# Patient Record
Sex: FEMALE | Race: WHITE | NOT HISPANIC OR LATINO | URBAN - METROPOLITAN AREA
[De-identification: names, ages, dates, MRNs, and addresses within clinical notes are randomized per-mention and may not be internally consistent; named-entity substitution may affect disease eponyms.]

---

## 2017-01-25 ENCOUNTER — EMERGENCY (EMERGENCY)
Facility: HOSPITAL | Age: 82
LOS: 1 days | Discharge: HOME | End: 2017-01-25
Attending: EMERGENCY MEDICINE | Admitting: EMERGENCY MEDICINE
Payer: MEDICARE

## 2017-01-25 VITALS
SYSTOLIC BLOOD PRESSURE: 132 MMHG | DIASTOLIC BLOOD PRESSURE: 77 MMHG | HEART RATE: 66 BPM | RESPIRATION RATE: 18 BRPM | OXYGEN SATURATION: 96 % | TEMPERATURE: 99 F

## 2017-01-25 VITALS
RESPIRATION RATE: 17 BRPM | SYSTOLIC BLOOD PRESSURE: 139 MMHG | HEART RATE: 76 BPM | OXYGEN SATURATION: 95 % | TEMPERATURE: 98 F | DIASTOLIC BLOOD PRESSURE: 76 MMHG

## 2017-01-25 DIAGNOSIS — M25.532 PAIN IN LEFT WRIST: ICD-10-CM

## 2017-01-25 LAB
ANION GAP SERPL CALC-SCNC: 13 MMOL/L — SIGNIFICANT CHANGE UP (ref 5–17)
APTT BLD: 33.5 SEC — SIGNIFICANT CHANGE UP (ref 27.5–37.4)
BASOPHILS # BLD AUTO: 0 K/UL — SIGNIFICANT CHANGE UP (ref 0–0.2)
BASOPHILS NFR BLD AUTO: 0.4 % — SIGNIFICANT CHANGE UP (ref 0–2)
BUN SERPL-MCNC: 16 MG/DL — SIGNIFICANT CHANGE UP (ref 7–23)
CALCIUM SERPL-MCNC: 10.2 MG/DL — SIGNIFICANT CHANGE UP (ref 8.4–10.5)
CHLORIDE SERPL-SCNC: 98 MMOL/L — SIGNIFICANT CHANGE UP (ref 96–108)
CO2 SERPL-SCNC: 29 MMOL/L — SIGNIFICANT CHANGE UP (ref 22–31)
CREAT SERPL-MCNC: 0.83 MG/DL — SIGNIFICANT CHANGE UP (ref 0.5–1.3)
EOSINOPHIL # BLD AUTO: 0.1 K/UL — SIGNIFICANT CHANGE UP (ref 0–0.5)
EOSINOPHIL NFR BLD AUTO: 0.7 % — SIGNIFICANT CHANGE UP (ref 0–6)
GLUCOSE SERPL-MCNC: 120 MG/DL — HIGH (ref 70–99)
HCT VFR BLD CALC: 40.9 % — SIGNIFICANT CHANGE UP (ref 34.5–45)
HGB BLD-MCNC: 13.7 G/DL — SIGNIFICANT CHANGE UP (ref 11.5–15.5)
INR BLD: 2.2 RATIO — HIGH (ref 0.88–1.16)
LYMPHOCYTES # BLD AUTO: 1.5 K/UL — SIGNIFICANT CHANGE UP (ref 1–3.3)
LYMPHOCYTES # BLD AUTO: 13.4 % — SIGNIFICANT CHANGE UP (ref 13–44)
MCHC RBC-ENTMCNC: 31.6 PG — SIGNIFICANT CHANGE UP (ref 27–34)
MCHC RBC-ENTMCNC: 33.5 GM/DL — SIGNIFICANT CHANGE UP (ref 32–36)
MCV RBC AUTO: 94.2 FL — SIGNIFICANT CHANGE UP (ref 80–100)
MONOCYTES # BLD AUTO: 1 K/UL — HIGH (ref 0–0.9)
MONOCYTES NFR BLD AUTO: 8.8 % — SIGNIFICANT CHANGE UP (ref 2–14)
NEUTROPHILS # BLD AUTO: 8.4 K/UL — HIGH (ref 1.8–7.4)
NEUTROPHILS NFR BLD AUTO: 76.6 % — SIGNIFICANT CHANGE UP (ref 43–77)
PLATELET # BLD AUTO: 191 K/UL — SIGNIFICANT CHANGE UP (ref 150–400)
POTASSIUM SERPL-MCNC: 4 MMOL/L — SIGNIFICANT CHANGE UP (ref 3.5–5.3)
POTASSIUM SERPL-SCNC: 4 MMOL/L — SIGNIFICANT CHANGE UP (ref 3.5–5.3)
PROTHROM AB SERPL-ACNC: 23.9 SEC — HIGH (ref 10–13.1)
RBC # BLD: 4.34 M/UL — SIGNIFICANT CHANGE UP (ref 3.8–5.2)
RBC # FLD: 13.7 % — SIGNIFICANT CHANGE UP (ref 10.3–14.5)
SODIUM SERPL-SCNC: 140 MMOL/L — SIGNIFICANT CHANGE UP (ref 135–145)
WBC # BLD: 11 K/UL — HIGH (ref 3.8–10.5)
WBC # FLD AUTO: 11 K/UL — HIGH (ref 3.8–10.5)

## 2017-01-25 PROCEDURE — 80048 BASIC METABOLIC PNL TOTAL CA: CPT

## 2017-01-25 PROCEDURE — 73090 X-RAY EXAM OF FOREARM: CPT | Mod: 26,LT

## 2017-01-25 PROCEDURE — 85610 PROTHROMBIN TIME: CPT

## 2017-01-25 PROCEDURE — 73090 X-RAY EXAM OF FOREARM: CPT

## 2017-01-25 PROCEDURE — 99284 EMERGENCY DEPT VISIT MOD MDM: CPT | Mod: 25

## 2017-01-25 PROCEDURE — 99284 EMERGENCY DEPT VISIT MOD MDM: CPT

## 2017-01-25 PROCEDURE — 73100 X-RAY EXAM OF WRIST: CPT | Mod: 26,59,LT

## 2017-01-25 PROCEDURE — 85730 THROMBOPLASTIN TIME PARTIAL: CPT

## 2017-01-25 PROCEDURE — 73562 X-RAY EXAM OF KNEE 3: CPT

## 2017-01-25 PROCEDURE — 73110 X-RAY EXAM OF WRIST: CPT | Mod: 26,LT

## 2017-01-25 PROCEDURE — 85027 COMPLETE CBC AUTOMATED: CPT

## 2017-01-25 PROCEDURE — 73110 X-RAY EXAM OF WRIST: CPT

## 2017-01-25 PROCEDURE — 73562 X-RAY EXAM OF KNEE 3: CPT | Mod: 26,LT

## 2017-01-25 PROCEDURE — 73100 X-RAY EXAM OF WRIST: CPT

## 2017-01-25 RX ORDER — ACETAMINOPHEN 500 MG
650 TABLET ORAL ONCE
Qty: 0 | Refills: 0 | Status: COMPLETED | OUTPATIENT
Start: 2017-01-25 | End: 2017-01-25

## 2017-01-25 RX ADMIN — Medication 650 MILLIGRAM(S): at 19:28

## 2017-01-25 RX ADMIN — Medication 650 MILLIGRAM(S): at 17:26

## 2017-01-25 NOTE — ED PROVIDER NOTE - CARE PLAN
Principal Discharge DX:	Closed nondisplaced fracture of head of left radius, initial encounter  Instructions for follow-up, activity and diet:	Rest, increase activity as tolerated. Return to the ER for any concerns.  Keep splint in place. Do not get wet or remove.   Follow up with your Fox [phone] or your own orthopedist. Return to the ER for any concerns such as numbness tingling, weakness or coolness ot extremity. Principal Discharge DX:	Closed nondisplaced fracture of head of left radius, initial encounter  Instructions for follow-up, activity and diet:	Rest, increase activity as tolerated. Return to the ER for any concerns.  Keep splint in place. Do not get wet or remove.   Follow up with your Fox [phone] or your own orthopedist. Return to the ER for any concerns such as numbness tingling, weakness or coolness ot extremity. Principal Discharge DX:	Closed nondisplaced fracture of head of left radius, initial encounter  Instructions for follow-up, activity and diet:	Rest, increase activity as tolerated. Return to the ER for any concerns.  Keep splint in place. Do not get wet or remove.   Follow up with your Fox [phone] or your own orthopedist. Return to the ER for any concerns such as numbness tingling, weakness or coolness ot extremity.

## 2017-01-25 NOTE — ED PROVIDER NOTE - DIAGNOSIS COUNSELING, MDM
I had a detailed discussion with the patient and/or guardian regarding the historical points, exam findings, and any diagnostic results supporting the discharge/admit diagnosis. conducted a detailed discussion...

## 2017-01-25 NOTE — ED PROCEDURE NOTE - NS ED PERI NEURO NEG
Pre-application: Motor, sensory, and vascular responses intact in the injured extremity./The patient/caregiver verbalized understanding of how to care for the injured extremity with splint/Post-application: Motor, sensory, and vascular responses intact in the injured extremity.

## 2017-01-25 NOTE — ED ADULT NURSE NOTE - PMH
Aortic stenosis  moderate  Asthma    Atrial fibrillation    HLD (hyperlipidemia)    HTN (hypertension)    Morbid obesity    Spinal stenosis

## 2017-01-25 NOTE — ED PROVIDER NOTE - PLAN OF CARE
Rest, increase activity as tolerated. Return to the ER for any concerns.  Keep splint in place. Do not get wet or remove.   Follow up with your Fox [phone] or your own orthopedist. Return to the ER for any concerns such as numbness tingling, weakness or coolness ot extremity.

## 2017-01-25 NOTE — ED PROVIDER NOTE - OBJECTIVE STATEMENT
85 yo female presents to the ER for evaluation of fracture to left distal radius s/p trip and fall. Pt states "I tripped and fell on uneven ground in my home and first landed on my knee and then my left hand. I went to the orthopedist and they sent me here to the ER. I am  having pain to my knee and did not have xrays done. Denies head injury. denies loc 85 yo female presents to the ER for evaluation of fracture to left distal radius s/p trip and fall. Pt states "I tripped and fell on uneven ground in my home and first landed on my knee and then my left hand. I went to the orthopedist and they sent me here to the ER. I am  having pain to my knee and did not have xrays done. Denies head injury. denies loc  riaz:  pain, moderate, constant, left wrist, + swelling.  left knee pain, mild, getting worse, able to walk.  did not hit head. no ha, neck pain, cp, abd pain.  sent from ortho for reduction.  recent inr "good"

## 2017-01-25 NOTE — ED PROVIDER NOTE - ATTENDING CONTRIBUTION TO CARE
. .mechanical fall - left wrist fx, reduced by ortho.  left knee contusion.  pt on coumadin, no signs of head injury/ich.  labs checked.  stable for d/c.  has help at home. .mechanical fall - left wrist fx, reduced/splinted by ortho.  left knee contusion.  pt on coumadin, no signs of head injury/ich.  labs checked.  stable for d/c.  has help at home.

## 2017-01-27 ENCOUNTER — EMERGENCY (EMERGENCY)
Facility: HOSPITAL | Age: 82
LOS: 1 days | Discharge: HOME | End: 2017-01-27
Attending: EMERGENCY MEDICINE | Admitting: EMERGENCY MEDICINE
Payer: MEDICARE

## 2017-01-27 VITALS
TEMPERATURE: 98 F | RESPIRATION RATE: 20 BRPM | DIASTOLIC BLOOD PRESSURE: 78 MMHG | HEART RATE: 85 BPM | SYSTOLIC BLOOD PRESSURE: 159 MMHG | OXYGEN SATURATION: 96 %

## 2017-01-27 DIAGNOSIS — S60.011A CONTUSION OF RIGHT THUMB WITHOUT DAMAGE TO NAIL, INITIAL ENCOUNTER: ICD-10-CM

## 2017-01-27 PROCEDURE — 99284 EMERGENCY DEPT VISIT MOD MDM: CPT

## 2017-01-27 PROCEDURE — 73130 X-RAY EXAM OF HAND: CPT

## 2017-01-27 PROCEDURE — 73090 X-RAY EXAM OF FOREARM: CPT | Mod: 26,LT

## 2017-01-27 PROCEDURE — 73130 X-RAY EXAM OF HAND: CPT | Mod: 26,LT

## 2017-01-27 PROCEDURE — 73090 X-RAY EXAM OF FOREARM: CPT

## 2017-01-27 PROCEDURE — 99283 EMERGENCY DEPT VISIT LOW MDM: CPT | Mod: GC

## 2017-01-27 NOTE — ED PROVIDER NOTE - MEDICAL DECISION MAKING DETAILS
83 y/o F p/w L. arm pain after colles fx diagnosed 2 days ago. Ace wrap removed and pt w/ good cap refill, full ROM of fingers, RUM nerves intact. Plan: Re-wrap, analgesia, ortho f/u.

## 2017-01-27 NOTE — ED PROVIDER NOTE - OBJECTIVE STATEMENT
85 y/o F presents w/ L arm pain. Fell 2 days ago, diagnosed w/ colles fx at [facility]. Seen by ortho, fx reduced, splinted, pt has f/u w/ ortho next Wed. Here b/c of pain to lateral aspect of forearm and bruising to thumb of left hand. Full ROM of all 5 fingers of L. hand.

## 2017-01-27 NOTE — ED PROVIDER NOTE - PROGRESS NOTE DETAILS
Took off ace wrap of splint. Minimal ecchymosis of L. thumb. Discussed plan w patient and family regarding plan in agreement. RG

## 2017-01-27 NOTE — ED ADULT NURSE NOTE - OBJECTIVE STATEMENT
c/o increasing pain and bruising to LUE. Patient was dx here at [facility] with LUE fx 2 days ago after falling. Able to move all fingers, no numbness or tingling.

## 2017-01-27 NOTE — ED PROVIDER NOTE - ATTENDING CONTRIBUTION TO CARE
****ATTENDING**** 83 yo f s/p L wrist fracture seen at [facility] s/p reduction pw pain at site of cast. Pt also notes that her thumb has also turned "blue". No numbness or tingling. No other acute complaints. On exam, L forearm in cast, cap refill <2secs. L thumb w ecchymosis non tender. nml sensation.  Dw patient that likely related to fracture leading to swelling and now ecchymosis. Patient is on coumadin. No other swelling or ecchymosis noted. Repeat xray, and check hand xray to ro injury. States tylenol is not enough and would like something stronger. States she has taken percocet before. Explained to be careful on medication and drowsiness. Family at bedside.

## 2017-01-27 NOTE — ED PROVIDER NOTE - PHYSICAL EXAMINATION
L. arm: Volar splint in place. Good cap refill of all fingers. Ecchymosis of thumb. Full sensation of all fingers, full ROM. Ulnar/median/radial nn intact. L. arm: sugar splint in place. Good cap refill of all fingers. Ecchymosis of thumb. Full sensation of all fingers, full ROM. Ulnar/median/radial nn intact.

## 2017-12-28 ENCOUNTER — INPATIENT (INPATIENT)
Facility: HOSPITAL | Age: 82
LOS: 13 days | Discharge: NURSING, REHAB OR LONG-TERM CARE | DRG: 949 | End: 2018-01-11
Attending: STUDENT IN AN ORGANIZED HEALTH CARE EDUCATION/TRAINING PROGRAM | Admitting: STUDENT IN AN ORGANIZED HEALTH CARE EDUCATION/TRAINING PROGRAM
Payer: MEDICARE

## 2017-12-28 DIAGNOSIS — I35.0 NONRHEUMATIC AORTIC (VALVE) STENOSIS: ICD-10-CM

## 2017-12-28 PROCEDURE — 99223 1ST HOSP IP/OBS HIGH 75: CPT

## 2017-12-29 PROCEDURE — 93010 ELECTROCARDIOGRAM REPORT: CPT

## 2017-12-29 PROCEDURE — 99232 SBSQ HOSP IP/OBS MODERATE 35: CPT

## 2017-12-29 PROCEDURE — 93970 EXTREMITY STUDY: CPT | Mod: 26

## 2017-12-30 PROCEDURE — 71010: CPT | Mod: 26

## 2017-12-30 PROCEDURE — 99232 SBSQ HOSP IP/OBS MODERATE 35: CPT

## 2017-12-31 PROCEDURE — 99232 SBSQ HOSP IP/OBS MODERATE 35: CPT

## 2018-01-01 PROCEDURE — 99223 1ST HOSP IP/OBS HIGH 75: CPT

## 2018-01-01 PROCEDURE — 99232 SBSQ HOSP IP/OBS MODERATE 35: CPT

## 2018-01-02 PROCEDURE — 93971 EXTREMITY STUDY: CPT | Mod: 26,RT

## 2018-01-02 PROCEDURE — 99222 1ST HOSP IP/OBS MODERATE 55: CPT

## 2018-01-02 PROCEDURE — 99232 SBSQ HOSP IP/OBS MODERATE 35: CPT

## 2018-01-03 PROCEDURE — 73080 X-RAY EXAM OF ELBOW: CPT | Mod: 26,RT

## 2018-01-03 PROCEDURE — 99233 SBSQ HOSP IP/OBS HIGH 50: CPT

## 2018-01-03 PROCEDURE — 73090 X-RAY EXAM OF FOREARM: CPT | Mod: 26,LT

## 2018-01-03 PROCEDURE — 99232 SBSQ HOSP IP/OBS MODERATE 35: CPT

## 2018-01-04 PROCEDURE — 99232 SBSQ HOSP IP/OBS MODERATE 35: CPT

## 2018-01-05 PROCEDURE — 99232 SBSQ HOSP IP/OBS MODERATE 35: CPT

## 2018-01-06 PROCEDURE — 99232 SBSQ HOSP IP/OBS MODERATE 35: CPT | Mod: GC

## 2018-01-07 PROCEDURE — 99232 SBSQ HOSP IP/OBS MODERATE 35: CPT

## 2018-01-08 PROCEDURE — 99232 SBSQ HOSP IP/OBS MODERATE 35: CPT

## 2018-01-09 PROCEDURE — 93306 TTE W/DOPPLER COMPLETE: CPT | Mod: 26

## 2018-01-09 PROCEDURE — 93010 ELECTROCARDIOGRAM REPORT: CPT

## 2018-01-09 PROCEDURE — 99223 1ST HOSP IP/OBS HIGH 75: CPT

## 2018-01-09 PROCEDURE — 99233 SBSQ HOSP IP/OBS HIGH 50: CPT

## 2018-01-10 PROCEDURE — 99233 SBSQ HOSP IP/OBS HIGH 50: CPT

## 2018-01-10 PROCEDURE — 99232 SBSQ HOSP IP/OBS MODERATE 35: CPT

## 2018-01-10 PROCEDURE — 71046 X-RAY EXAM CHEST 2 VIEWS: CPT | Mod: 26

## 2018-01-11 PROCEDURE — 99238 HOSP IP/OBS DSCHRG MGMT 30/<: CPT

## 2018-01-11 PROCEDURE — 99232 SBSQ HOSP IP/OBS MODERATE 35: CPT

## 2018-01-13 ENCOUNTER — OUTPATIENT (OUTPATIENT)
Dept: OUTPATIENT SERVICES | Facility: HOSPITAL | Age: 83
LOS: 1 days | Discharge: HOME | End: 2018-01-13
Payer: MEDICARE

## 2018-01-13 DIAGNOSIS — G89.29 OTHER CHRONIC PAIN: ICD-10-CM

## 2018-01-13 PROCEDURE — 73090 X-RAY EXAM OF FOREARM: CPT | Mod: 26,RT

## 2018-01-15 ENCOUNTER — OUTPATIENT (OUTPATIENT)
Dept: OUTPATIENT SERVICES | Facility: HOSPITAL | Age: 83
LOS: 1 days | Discharge: HOME | End: 2018-01-15
Payer: MEDICARE

## 2018-01-15 DIAGNOSIS — S63.501A UNSPECIFIED SPRAIN OF RIGHT WRIST, INITIAL ENCOUNTER: ICD-10-CM

## 2018-01-15 PROCEDURE — 73200 CT UPPER EXTREMITY W/O DYE: CPT | Mod: 26,RT

## 2018-01-18 ENCOUNTER — OUTPATIENT (OUTPATIENT)
Dept: OUTPATIENT SERVICES | Facility: HOSPITAL | Age: 83
LOS: 1 days | Discharge: ANOTHER HOSPITAL | End: 2018-01-18
Payer: MEDICARE

## 2018-01-18 ENCOUNTER — APPOINTMENT (OUTPATIENT)
Dept: CT IMAGING | Facility: HOSPITAL | Age: 83
End: 2018-01-18

## 2018-01-18 DIAGNOSIS — C41.9 MALIGNANT NEOPLASM OF BONE AND ARTICULAR CARTILAGE, UNSPECIFIED: ICD-10-CM

## 2018-01-18 PROCEDURE — 97163 PT EVAL HIGH COMPLEX 45 MIN: CPT

## 2018-01-18 PROCEDURE — 94640 AIRWAY INHALATION TREATMENT: CPT

## 2018-01-18 PROCEDURE — 73080 X-RAY EXAM OF ELBOW: CPT

## 2018-01-18 PROCEDURE — 74177 CT ABD & PELVIS W/CONTRAST: CPT | Mod: 26

## 2018-01-18 PROCEDURE — 82728 ASSAY OF FERRITIN: CPT

## 2018-01-18 PROCEDURE — 97116 GAIT TRAINING THERAPY: CPT

## 2018-01-18 PROCEDURE — 82746 ASSAY OF FOLIC ACID SERUM: CPT

## 2018-01-18 PROCEDURE — 71045 X-RAY EXAM CHEST 1 VIEW: CPT

## 2018-01-18 PROCEDURE — 85025 COMPLETE CBC W/AUTO DIFF WBC: CPT

## 2018-01-18 PROCEDURE — 93005 ELECTROCARDIOGRAM TRACING: CPT

## 2018-01-18 PROCEDURE — 85027 COMPLETE CBC AUTOMATED: CPT

## 2018-01-18 PROCEDURE — 93306 TTE W/DOPPLER COMPLETE: CPT

## 2018-01-18 PROCEDURE — 84165 PROTEIN E-PHORESIS SERUM: CPT

## 2018-01-18 PROCEDURE — 84156 ASSAY OF PROTEIN URINE: CPT

## 2018-01-18 PROCEDURE — 97110 THERAPEUTIC EXERCISES: CPT

## 2018-01-18 PROCEDURE — 84443 ASSAY THYROID STIM HORMONE: CPT

## 2018-01-18 PROCEDURE — 86140 C-REACTIVE PROTEIN: CPT

## 2018-01-18 PROCEDURE — 83735 ASSAY OF MAGNESIUM: CPT

## 2018-01-18 PROCEDURE — 84439 ASSAY OF FREE THYROXINE: CPT

## 2018-01-18 PROCEDURE — 71046 X-RAY EXAM CHEST 2 VIEWS: CPT

## 2018-01-18 PROCEDURE — 80048 BASIC METABOLIC PNL TOTAL CA: CPT

## 2018-01-18 PROCEDURE — 93971 EXTREMITY STUDY: CPT

## 2018-01-18 PROCEDURE — 83521 IG LIGHT CHAINS FREE EACH: CPT

## 2018-01-18 PROCEDURE — 86334 IMMUNOFIX E-PHORESIS SERUM: CPT

## 2018-01-18 PROCEDURE — 84155 ASSAY OF PROTEIN SERUM: CPT

## 2018-01-18 PROCEDURE — 83550 IRON BINDING TEST: CPT

## 2018-01-18 PROCEDURE — 84100 ASSAY OF PHOSPHORUS: CPT

## 2018-01-18 PROCEDURE — 93970 EXTREMITY STUDY: CPT

## 2018-01-18 PROCEDURE — 97167 OT EVAL HIGH COMPLEX 60 MIN: CPT

## 2018-01-18 PROCEDURE — 97535 SELF CARE MNGMENT TRAINING: CPT

## 2018-01-18 PROCEDURE — 82607 VITAMIN B-12: CPT

## 2018-01-18 PROCEDURE — 73090 X-RAY EXAM OF FOREARM: CPT

## 2018-01-18 PROCEDURE — 97530 THERAPEUTIC ACTIVITIES: CPT

## 2018-01-18 PROCEDURE — 97112 NEUROMUSCULAR REEDUCATION: CPT

## 2018-01-18 PROCEDURE — 85045 AUTOMATED RETICULOCYTE COUNT: CPT

## 2018-01-18 PROCEDURE — 85610 PROTHROMBIN TIME: CPT

## 2018-01-18 PROCEDURE — 71260 CT THORAX DX C+: CPT | Mod: 26

## 2018-01-18 PROCEDURE — 82652 VIT D 1 25-DIHYDROXY: CPT

## 2018-01-18 PROCEDURE — 86325 OTHER IMMUNOELECTROPHORESIS: CPT

## 2018-01-18 PROCEDURE — 85652 RBC SED RATE AUTOMATED: CPT

## 2018-01-18 PROCEDURE — 80053 COMPREHEN METABOLIC PANEL: CPT

## 2018-01-24 ENCOUNTER — OUTPATIENT (OUTPATIENT)
Dept: OUTPATIENT SERVICES | Facility: HOSPITAL | Age: 83
LOS: 1 days | Discharge: HOME | End: 2018-01-24
Payer: MEDICARE

## 2018-01-24 DIAGNOSIS — C79.9 SECONDARY MALIGNANT NEOPLASM OF UNSPECIFIED SITE: ICD-10-CM

## 2018-01-24 PROCEDURE — 78306 BONE IMAGING WHOLE BODY: CPT | Mod: 26

## 2018-01-27 ENCOUNTER — OUTPATIENT (OUTPATIENT)
Dept: OUTPATIENT SERVICES | Facility: HOSPITAL | Age: 83
LOS: 1 days | Discharge: HOME | End: 2018-01-27

## 2018-01-27 DIAGNOSIS — I50.9 HEART FAILURE, UNSPECIFIED: ICD-10-CM

## 2018-01-30 ENCOUNTER — OUTPATIENT (OUTPATIENT)
Dept: OUTPATIENT SERVICES | Facility: HOSPITAL | Age: 83
LOS: 1 days | Discharge: HOME | End: 2018-01-30
Payer: MEDICARE

## 2018-01-30 DIAGNOSIS — M25.562 PAIN IN LEFT KNEE: ICD-10-CM

## 2018-01-30 PROCEDURE — 73700 CT LOWER EXTREMITY W/O DYE: CPT | Mod: 26,LT

## 2018-02-03 ENCOUNTER — OUTPATIENT (OUTPATIENT)
Dept: OUTPATIENT SERVICES | Facility: HOSPITAL | Age: 83
LOS: 1 days | Discharge: HOME | End: 2018-02-03
Payer: MEDICARE

## 2018-02-03 DIAGNOSIS — S82.002C: ICD-10-CM

## 2018-02-03 PROCEDURE — 73562 X-RAY EXAM OF KNEE 3: CPT | Mod: 26,LT

## 2018-02-05 ENCOUNTER — RESULT REVIEW (OUTPATIENT)
Age: 83
End: 2018-02-05

## 2018-02-05 PROCEDURE — 88305 TISSUE EXAM BY PATHOLOGIST: CPT | Mod: 26

## 2018-02-06 ENCOUNTER — INPATIENT (INPATIENT)
Facility: HOSPITAL | Age: 83
LOS: 3 days | Discharge: HOME | End: 2018-02-10
Attending: FAMILY MEDICINE | Admitting: FAMILY MEDICINE
Payer: MEDICARE

## 2018-02-06 VITALS
HEART RATE: 68 BPM | RESPIRATION RATE: 20 BRPM | DIASTOLIC BLOOD PRESSURE: 85 MMHG | SYSTOLIC BLOOD PRESSURE: 158 MMHG | TEMPERATURE: 98 F | OXYGEN SATURATION: 98 % | HEIGHT: 63 IN | WEIGHT: 240.08 LBS

## 2018-02-06 DIAGNOSIS — Z88.2 ALLERGY STATUS TO SULFONAMIDES: ICD-10-CM

## 2018-02-06 DIAGNOSIS — C50.919 MALIGNANT NEOPLASM OF UNSPECIFIED SITE OF UNSPECIFIED FEMALE BREAST: ICD-10-CM

## 2018-02-06 DIAGNOSIS — C25.9 MALIGNANT NEOPLASM OF PANCREAS, UNSPECIFIED: ICD-10-CM

## 2018-02-06 DIAGNOSIS — J45.20 MILD INTERMITTENT ASTHMA, UNCOMPLICATED: ICD-10-CM

## 2018-02-06 DIAGNOSIS — C64.9 MALIGNANT NEOPLASM OF UNSPECIFIED KIDNEY, EXCEPT RENAL PELVIS: ICD-10-CM

## 2018-02-06 DIAGNOSIS — C79.51 SECONDARY MALIGNANT NEOPLASM OF BONE: ICD-10-CM

## 2018-02-06 DIAGNOSIS — I35.0 NONRHEUMATIC AORTIC (VALVE) STENOSIS: ICD-10-CM

## 2018-02-06 DIAGNOSIS — E83.52 HYPERCALCEMIA: ICD-10-CM

## 2018-02-06 DIAGNOSIS — E83.39 OTHER DISORDERS OF PHOSPHORUS METABOLISM: ICD-10-CM

## 2018-02-06 DIAGNOSIS — E86.0 DEHYDRATION: ICD-10-CM

## 2018-02-06 DIAGNOSIS — N28.89 OTHER SPECIFIED DISORDERS OF KIDNEY AND URETER: ICD-10-CM

## 2018-02-06 DIAGNOSIS — Z79.01 LONG TERM (CURRENT) USE OF ANTICOAGULANTS: ICD-10-CM

## 2018-02-06 DIAGNOSIS — E66.9 OBESITY, UNSPECIFIED: ICD-10-CM

## 2018-02-06 DIAGNOSIS — Z79.82 LONG TERM (CURRENT) USE OF ASPIRIN: ICD-10-CM

## 2018-02-06 DIAGNOSIS — E21.3 HYPERPARATHYROIDISM, UNSPECIFIED: ICD-10-CM

## 2018-02-06 DIAGNOSIS — R09.02 HYPOXEMIA: ICD-10-CM

## 2018-02-06 DIAGNOSIS — E78.5 HYPERLIPIDEMIA, UNSPECIFIED: ICD-10-CM

## 2018-02-06 DIAGNOSIS — I48.2 CHRONIC ATRIAL FIBRILLATION: ICD-10-CM

## 2018-02-06 DIAGNOSIS — I10 ESSENTIAL (PRIMARY) HYPERTENSION: ICD-10-CM

## 2018-02-06 LAB
ALBUMIN SERPL ELPH-MCNC: 2.8 G/DL — LOW (ref 3.3–5)
ALP SERPL-CCNC: 80 U/L — SIGNIFICANT CHANGE UP (ref 40–120)
ALT FLD-CCNC: 15 U/L — SIGNIFICANT CHANGE UP (ref 12–78)
ANION GAP SERPL CALC-SCNC: 1 MMOL/L — LOW (ref 5–17)
AST SERPL-CCNC: 19 U/L — SIGNIFICANT CHANGE UP (ref 15–37)
BASOPHILS # BLD AUTO: 0.04 K/UL — SIGNIFICANT CHANGE UP (ref 0–0.2)
BASOPHILS NFR BLD AUTO: 0.4 % — SIGNIFICANT CHANGE UP (ref 0–2)
BILIRUB SERPL-MCNC: 0.3 MG/DL — SIGNIFICANT CHANGE UP (ref 0.2–1.2)
BUN SERPL-MCNC: 13 MG/DL — SIGNIFICANT CHANGE UP (ref 7–23)
CALCIUM SERPL-MCNC: 12.3 MG/DL — HIGH (ref 8.5–10.1)
CHLORIDE SERPL-SCNC: 94 MMOL/L — LOW (ref 96–108)
CO2 SERPL-SCNC: 45 MMOL/L — CRITICAL HIGH (ref 22–31)
CREAT SERPL-MCNC: 0.6 MG/DL — SIGNIFICANT CHANGE UP (ref 0.5–1.3)
EOSINOPHIL # BLD AUTO: 0.15 K/UL — SIGNIFICANT CHANGE UP (ref 0–0.5)
EOSINOPHIL NFR BLD AUTO: 1.5 % — SIGNIFICANT CHANGE UP (ref 0–6)
GLUCOSE SERPL-MCNC: 127 MG/DL — HIGH (ref 70–99)
HCT VFR BLD CALC: 40.4 % — SIGNIFICANT CHANGE UP (ref 34.5–45)
HGB BLD-MCNC: 13 G/DL — SIGNIFICANT CHANGE UP (ref 11.5–15.5)
IMM GRANULOCYTES NFR BLD AUTO: 0.6 % — SIGNIFICANT CHANGE UP (ref 0–1.5)
LYMPHOCYTES # BLD AUTO: 1.21 K/UL — SIGNIFICANT CHANGE UP (ref 1–3.3)
LYMPHOCYTES # BLD AUTO: 12.1 % — LOW (ref 13–44)
MCHC RBC-ENTMCNC: 30 PG — SIGNIFICANT CHANGE UP (ref 27–34)
MCHC RBC-ENTMCNC: 32.2 GM/DL — SIGNIFICANT CHANGE UP (ref 32–36)
MCV RBC AUTO: 93.1 FL — SIGNIFICANT CHANGE UP (ref 80–100)
MONOCYTES # BLD AUTO: 0.99 K/UL — HIGH (ref 0–0.9)
MONOCYTES NFR BLD AUTO: 9.9 % — SIGNIFICANT CHANGE UP (ref 2–14)
NEUTROPHILS # BLD AUTO: 7.58 K/UL — HIGH (ref 1.8–7.4)
NEUTROPHILS NFR BLD AUTO: 75.5 % — SIGNIFICANT CHANGE UP (ref 43–77)
NRBC # BLD: 0 /100 WBCS — SIGNIFICANT CHANGE UP (ref 0–0)
PLATELET # BLD AUTO: 240 K/UL — SIGNIFICANT CHANGE UP (ref 150–400)
POTASSIUM SERPL-MCNC: 3.8 MMOL/L — SIGNIFICANT CHANGE UP (ref 3.5–5.3)
POTASSIUM SERPL-SCNC: 3.8 MMOL/L — SIGNIFICANT CHANGE UP (ref 3.5–5.3)
PROT SERPL-MCNC: 7.6 GM/DL — SIGNIFICANT CHANGE UP (ref 6–8.3)
RBC # BLD: 4.34 M/UL — SIGNIFICANT CHANGE UP (ref 3.8–5.2)
RBC # FLD: 13.4 % — SIGNIFICANT CHANGE UP (ref 10.3–14.5)
SODIUM SERPL-SCNC: 140 MMOL/L — SIGNIFICANT CHANGE UP (ref 135–145)
WBC # BLD: 10.03 K/UL — SIGNIFICANT CHANGE UP (ref 3.8–10.5)
WBC # FLD AUTO: 10.03 K/UL — SIGNIFICANT CHANGE UP (ref 3.8–10.5)

## 2018-02-06 PROCEDURE — 93010 ELECTROCARDIOGRAM REPORT: CPT

## 2018-02-06 PROCEDURE — 99223 1ST HOSP IP/OBS HIGH 75: CPT

## 2018-02-06 PROCEDURE — 99285 EMERGENCY DEPT VISIT HI MDM: CPT

## 2018-02-06 RX ORDER — SODIUM CHLORIDE 9 MG/ML
500 INJECTION INTRAMUSCULAR; INTRAVENOUS; SUBCUTANEOUS ONCE
Qty: 0 | Refills: 0 | Status: COMPLETED | OUTPATIENT
Start: 2018-02-06 | End: 2018-02-06

## 2018-02-06 RX ORDER — PAMIDRONATE DISODIUM 9 MG/ML
90 INJECTION, SOLUTION INTRAVENOUS ONCE
Qty: 0 | Refills: 0 | Status: COMPLETED | OUTPATIENT
Start: 2018-02-06 | End: 2018-02-06

## 2018-02-06 RX ORDER — SODIUM CHLORIDE 9 MG/ML
1000 INJECTION INTRAMUSCULAR; INTRAVENOUS; SUBCUTANEOUS ONCE
Qty: 0 | Refills: 0 | Status: COMPLETED | OUTPATIENT
Start: 2018-02-06 | End: 2018-02-06

## 2018-02-06 RX ADMIN — SODIUM CHLORIDE 500 MILLILITER(S): 9 INJECTION INTRAMUSCULAR; INTRAVENOUS; SUBCUTANEOUS at 22:12

## 2018-02-06 RX ADMIN — SODIUM CHLORIDE 1000 MILLILITER(S): 9 INJECTION INTRAMUSCULAR; INTRAVENOUS; SUBCUTANEOUS at 22:50

## 2018-02-06 RX ADMIN — PAMIDRONATE DISODIUM 70 MILLIGRAM(S): 9 INJECTION, SOLUTION INTRAVENOUS at 23:38

## 2018-02-06 NOTE — ED PROVIDER NOTE - OBJECTIVE STATEMENT
Pertinent PMH/PSH/FHx/SHx and Review of Systems contained within:  86 yo f with PMH of Breast Ca with osteolytic bone mets, HTN, HLD and Afib BIB RN from [facility] for elevated Ca levels. Patient reports feeling for lethargic and having bodyaches. Patient also states she feels she's more forgetful than usual. No aggravating or relieving factors, No fever/chills, No photophobia/eye pain/changes in vision, No ear pain/sore throat/dysphagia, No chest pain/palpitations, no SOB/cough/wheeze/stridor, No abdominal pain, No N/V/D, no dysuria/frequency/discharge, No neck/back pain, no rash, no changes in neurological status/function.

## 2018-02-06 NOTE — H&P ADULT - HISTORY OF PRESENT ILLNESS
Admission for hypercalcemia in progress. 85 year old woman with PMH of Breast Ca with osteolytic bone mets, HTN, HLD and Afib on coumadin sent from [facility] for hypercalcemia.  Pt offers no complaints and was sent over by PMD.  Recently fractured right arm.  Admission done on behalf of PMD.      In the ED, Ca found to be 12.3, rest of BMP consistent with dehydration.  Ionized Ca pending.  Started on IVF and given 1 dose IV pamidronate in ED.

## 2018-02-06 NOTE — H&P ADULT - PROBLEM SELECTOR PLAN 1
IVF hydration-monitor ins and outs   Follow ionized Ca  Serial Ca measurements  Consider endocrine consult  Will send PTH

## 2018-02-06 NOTE — H&P ADULT - NSHPPHYSICALEXAM_GEN_ALL_CORE
GENERAL: NAD, obese elderly woman  HEAD:  Atraumatic, Normocephalic  EYES: EOMI, PERRLA, conjunctiva and sclera clear  ENMT: No tonsillar erythema, exudates, or enlargement; Moist mucous membranes, No lesions  NECK: Supple, No JVD, Normal thyroid  NERVOUS SYSTEM:  Alert & Oriented X3, Good concentration  CHEST/LUNG: Clear to ascultation bilaterally; No rales, rhonchi, wheezing, or rubs  HEART: Regular rate and rhythm; No murmurs, rubs, or gallops  ABDOMEN: Soft, Nontender, Nondistended; Bowel sounds present  EXTREMITIES: Right arm in cast  SKIN: no rashes or lesions  PSYCH: normal affect and behavior

## 2018-02-06 NOTE — H&P ADULT - NSHPLABSRESULTS_GEN_ALL_CORE
Vital Signs Last 24 Hrs  T(C): 37.1 (07 Feb 2018 03:40), Max: 37.1 (07 Feb 2018 01:13)  T(F): 98.7 (07 Feb 2018 03:40), Max: 98.7 (07 Feb 2018 01:13)  HR: 74 (07 Feb 2018 03:40) (64 - 74)  BP: 138/65 (07 Feb 2018 03:40) (138/65 - 179/65)  BP(mean): --  RR: 18 (07 Feb 2018 01:13) (18 - 20)  SpO2: 99% (07 Feb 2018 03:40) (98% - 99%)        LABS:                        13.0   10.03 )-----------( 240      ( 06 Feb 2018 22:03 )             40.4     02-06    140  |  94<L>  |  13  ----------------------------<  127<H>  3.8   |  45<HH>  |  0.60    Ca    12.3<H>      06 Feb 2018 22:03    TPro  7.6  /  Alb  2.8<L>  /  TBili  0.3  /  DBili  x   /  AST  19  /  ALT  15  /  AlkPhos  80  02-06

## 2018-02-06 NOTE — H&P ADULT - ASSESSMENT
85 year old woman with PMH of Breast Ca with osteolytic bone mets, HTN, HLD and Afib on coumadin sent from [facility] for hypercalcemia.  Patient will require treatment for hypercalcemia likely from bone mets as detailed below:    IMPROVE VTE Individual Risk Assessment          RISK                                                          Points    [  ] Previous VTE                                                3    [  ] Thrombophilia                                             2    [  ] Lower limb paralysis                                    2        (unable to hold up >15 seconds)      [  ] Current Cancer                                             2         (within 6 months)    [ x ] Immobilization > 24 hrs                              1    [  ] ICU/CCU stay > 24 hours                            1    [ x ] Age > 60                                                    1    IMPROVE VTE Score ____2_____

## 2018-02-06 NOTE — ED PROVIDER NOTE - MEDICAL DECISION MAKING DETAILS
Labs reviewed. Patient given ivfs. Bisphosphonate ordered. Patient admitted to medicine for further management.

## 2018-02-06 NOTE — ED ADULT NURSE NOTE - OBJECTIVE STATEMENT
85yr old femlale sent by PMD  hypercalcemia. patient denies any associated symptoms. patient with right arm in an ace bandage 85yr old femlale sent by PMD  hypercalcemia. patient denies any associated symptoms. patient with right arm in an ace bandage. patient alert on name, noted with confusion

## 2018-02-07 DIAGNOSIS — E83.52 HYPERCALCEMIA: ICD-10-CM

## 2018-02-07 DIAGNOSIS — C79.9 SECONDARY MALIGNANT NEOPLASM OF UNSPECIFIED SITE: ICD-10-CM

## 2018-02-07 DIAGNOSIS — Z29.9 ENCOUNTER FOR PROPHYLACTIC MEASURES, UNSPECIFIED: ICD-10-CM

## 2018-02-07 DIAGNOSIS — I48.2 CHRONIC ATRIAL FIBRILLATION: ICD-10-CM

## 2018-02-07 DIAGNOSIS — E78.2 MIXED HYPERLIPIDEMIA: ICD-10-CM

## 2018-02-07 DIAGNOSIS — J45.20 MILD INTERMITTENT ASTHMA, UNCOMPLICATED: ICD-10-CM

## 2018-02-07 DIAGNOSIS — I10 ESSENTIAL (PRIMARY) HYPERTENSION: ICD-10-CM

## 2018-02-07 DIAGNOSIS — E66.01 MORBID (SEVERE) OBESITY DUE TO EXCESS CALORIES: ICD-10-CM

## 2018-02-07 LAB
ALBUMIN SERPL ELPH-MCNC: 2.5 G/DL — LOW (ref 3.3–5)
ALP SERPL-CCNC: 73 U/L — SIGNIFICANT CHANGE UP (ref 40–120)
ALT FLD-CCNC: 15 U/L — SIGNIFICANT CHANGE UP (ref 12–78)
ANION GAP SERPL CALC-SCNC: 4 MMOL/L — LOW (ref 5–17)
AST SERPL-CCNC: 11 U/L — LOW (ref 15–37)
BASE EXCESS BLDV CALC-SCNC: 13.8 MMOL/L — HIGH (ref -2–2)
BILIRUB SERPL-MCNC: 0.4 MG/DL — SIGNIFICANT CHANGE UP (ref 0.2–1.2)
BLOOD GAS COMMENTS, VENOUS: SIGNIFICANT CHANGE UP
BUN SERPL-MCNC: 13 MG/DL — SIGNIFICANT CHANGE UP (ref 7–23)
CA-I BLD-SCNC: 1.6 MMOL/L — CRITICAL HIGH (ref 1.12–1.3)
CALCIUM SERPL-MCNC: 11.1 MG/DL — HIGH (ref 8.5–10.1)
CALCIUM SERPL-MCNC: 11.6 MG/DL — HIGH (ref 8.5–10.1)
CALCIUM SERPL-MCNC: 12.4 MG/DL — HIGH (ref 8.4–10.5)
CHLORIDE SERPL-SCNC: 95 MMOL/L — LOW (ref 96–108)
CO2 SERPL-SCNC: 40 MMOL/L — HIGH (ref 22–31)
CREAT SERPL-MCNC: 0.54 MG/DL — SIGNIFICANT CHANGE UP (ref 0.5–1.3)
GAS PNL BLDV: SIGNIFICANT CHANGE UP
GLUCOSE SERPL-MCNC: 128 MG/DL — HIGH (ref 70–99)
HCO3 BLDV-SCNC: 40 MMOL/L — HIGH (ref 21–29)
HCT VFR BLD CALC: 36.3 % — SIGNIFICANT CHANGE UP (ref 34.5–45)
HGB BLD-MCNC: 11.5 G/DL — SIGNIFICANT CHANGE UP (ref 11.5–15.5)
HOROWITZ INDEX BLDV+IHG-RTO: 28 — SIGNIFICANT CHANGE UP
INR BLD: 2 RATIO — HIGH (ref 0.88–1.16)
MCHC RBC-ENTMCNC: 29.5 PG — SIGNIFICANT CHANGE UP (ref 27–34)
MCHC RBC-ENTMCNC: 31.7 GM/DL — LOW (ref 32–36)
MCV RBC AUTO: 93.1 FL — SIGNIFICANT CHANGE UP (ref 80–100)
NRBC # BLD: 0 /100 WBCS — SIGNIFICANT CHANGE UP (ref 0–0)
PCO2 BLDV: 56 MMHG — HIGH (ref 35–50)
PH BLDV: 7.46 — HIGH (ref 7.35–7.45)
PLATELET # BLD AUTO: 216 K/UL — SIGNIFICANT CHANGE UP (ref 150–400)
PO2 BLDV: 202 MMHG — HIGH (ref 25–45)
POTASSIUM SERPL-MCNC: 3.6 MMOL/L — SIGNIFICANT CHANGE UP (ref 3.5–5.3)
POTASSIUM SERPL-SCNC: 3.6 MMOL/L — SIGNIFICANT CHANGE UP (ref 3.5–5.3)
PROT SERPL-MCNC: 6.8 GM/DL — SIGNIFICANT CHANGE UP (ref 6–8.3)
PROTHROM AB SERPL-ACNC: 22.1 SEC — HIGH (ref 9.8–12.7)
PTH-INTACT FLD-MCNC: 93 PG/ML — HIGH (ref 15–65)
RBC # BLD: 3.9 M/UL — SIGNIFICANT CHANGE UP (ref 3.8–5.2)
RBC # FLD: 13.5 % — SIGNIFICANT CHANGE UP (ref 10.3–14.5)
SAO2 % BLDV: 100 % — HIGH (ref 67–88)
SODIUM SERPL-SCNC: 139 MMOL/L — SIGNIFICANT CHANGE UP (ref 135–145)
WBC # BLD: 8.55 K/UL — SIGNIFICANT CHANGE UP (ref 3.8–10.5)
WBC # FLD AUTO: 8.55 K/UL — SIGNIFICANT CHANGE UP (ref 3.8–10.5)

## 2018-02-07 RX ORDER — SERTRALINE 25 MG/1
75 TABLET, FILM COATED ORAL DAILY
Qty: 0 | Refills: 0 | Status: DISCONTINUED | OUTPATIENT
Start: 2018-02-07 | End: 2018-02-10

## 2018-02-07 RX ORDER — ATORVASTATIN CALCIUM 80 MG/1
10 TABLET, FILM COATED ORAL AT BEDTIME
Qty: 0 | Refills: 0 | Status: DISCONTINUED | OUTPATIENT
Start: 2018-02-07 | End: 2018-02-10

## 2018-02-07 RX ORDER — PETROLATUM,WHITE
1 JELLY (GRAM) TOPICAL
Qty: 0 | Refills: 0 | Status: DISCONTINUED | OUTPATIENT
Start: 2018-02-07 | End: 2018-02-10

## 2018-02-07 RX ORDER — ALBUTEROL 90 UG/1
2 AEROSOL, METERED ORAL EVERY 6 HOURS
Qty: 0 | Refills: 0 | Status: DISCONTINUED | OUTPATIENT
Start: 2018-02-07 | End: 2018-02-09

## 2018-02-07 RX ORDER — METOPROLOL TARTRATE 50 MG
100 TABLET ORAL DAILY
Qty: 0 | Refills: 0 | Status: DISCONTINUED | OUTPATIENT
Start: 2018-02-07 | End: 2018-02-10

## 2018-02-07 RX ORDER — DILTIAZEM HCL 120 MG
120 CAPSULE, EXT RELEASE 24 HR ORAL DAILY
Qty: 0 | Refills: 0 | Status: DISCONTINUED | OUTPATIENT
Start: 2018-02-07 | End: 2018-02-10

## 2018-02-07 RX ORDER — ASPIRIN/CALCIUM CARB/MAGNESIUM 324 MG
81 TABLET ORAL DAILY
Qty: 0 | Refills: 0 | Status: DISCONTINUED | OUTPATIENT
Start: 2018-02-07 | End: 2018-02-10

## 2018-02-07 RX ORDER — LOSARTAN POTASSIUM 100 MG/1
100 TABLET, FILM COATED ORAL DAILY
Qty: 0 | Refills: 0 | Status: DISCONTINUED | OUTPATIENT
Start: 2018-02-07 | End: 2018-02-10

## 2018-02-07 RX ORDER — WARFARIN SODIUM 2.5 MG/1
7.5 TABLET ORAL ONCE
Qty: 0 | Refills: 0 | Status: COMPLETED | OUTPATIENT
Start: 2018-02-07 | End: 2018-02-07

## 2018-02-07 RX ORDER — SODIUM CHLORIDE 9 MG/ML
1000 INJECTION INTRAMUSCULAR; INTRAVENOUS; SUBCUTANEOUS
Qty: 0 | Refills: 0 | Status: DISCONTINUED | OUTPATIENT
Start: 2018-02-07 | End: 2018-02-08

## 2018-02-07 RX ORDER — OXYCODONE AND ACETAMINOPHEN 5; 325 MG/1; MG/1
1 TABLET ORAL EVERY 6 HOURS
Qty: 0 | Refills: 0 | Status: DISCONTINUED | OUTPATIENT
Start: 2018-02-07 | End: 2018-02-10

## 2018-02-07 RX ORDER — PAMIDRONATE DISODIUM 9 MG/ML
60 INJECTION, SOLUTION INTRAVENOUS ONCE
Qty: 0 | Refills: 0 | Status: DISCONTINUED | OUTPATIENT
Start: 2018-02-07 | End: 2018-02-07

## 2018-02-07 RX ORDER — PANTOPRAZOLE SODIUM 20 MG/1
40 TABLET, DELAYED RELEASE ORAL
Qty: 0 | Refills: 0 | Status: DISCONTINUED | OUTPATIENT
Start: 2018-02-07 | End: 2018-02-10

## 2018-02-07 RX ORDER — FUROSEMIDE 40 MG
40 TABLET ORAL
Qty: 0 | Refills: 0 | Status: DISCONTINUED | OUTPATIENT
Start: 2018-02-07 | End: 2018-02-07

## 2018-02-07 RX ORDER — BUDESONIDE AND FORMOTEROL FUMARATE DIHYDRATE 160; 4.5 UG/1; UG/1
2 AEROSOL RESPIRATORY (INHALATION)
Qty: 0 | Refills: 0 | Status: DISCONTINUED | OUTPATIENT
Start: 2018-02-07 | End: 2018-02-10

## 2018-02-07 RX ADMIN — ATORVASTATIN CALCIUM 10 MILLIGRAM(S): 80 TABLET, FILM COATED ORAL at 21:27

## 2018-02-07 RX ADMIN — WARFARIN SODIUM 7.5 MILLIGRAM(S): 2.5 TABLET ORAL at 21:27

## 2018-02-07 RX ADMIN — SODIUM CHLORIDE 100 MILLILITER(S): 9 INJECTION INTRAMUSCULAR; INTRAVENOUS; SUBCUTANEOUS at 06:32

## 2018-02-07 RX ADMIN — PANTOPRAZOLE SODIUM 40 MILLIGRAM(S): 20 TABLET, DELAYED RELEASE ORAL at 09:22

## 2018-02-07 RX ADMIN — LOSARTAN POTASSIUM 100 MILLIGRAM(S): 100 TABLET, FILM COATED ORAL at 06:02

## 2018-02-07 RX ADMIN — Medication 40 MILLIGRAM(S): at 06:01

## 2018-02-07 RX ADMIN — BUDESONIDE AND FORMOTEROL FUMARATE DIHYDRATE 2 PUFF(S): 160; 4.5 AEROSOL RESPIRATORY (INHALATION) at 06:01

## 2018-02-07 RX ADMIN — Medication 100 MILLIGRAM(S): at 06:01

## 2018-02-07 RX ADMIN — Medication 1 APPLICATION(S): at 21:27

## 2018-02-07 RX ADMIN — BUDESONIDE AND FORMOTEROL FUMARATE DIHYDRATE 2 PUFF(S): 160; 4.5 AEROSOL RESPIRATORY (INHALATION) at 17:15

## 2018-02-07 RX ADMIN — SERTRALINE 75 MILLIGRAM(S): 25 TABLET, FILM COATED ORAL at 20:39

## 2018-02-07 RX ADMIN — Medication 81 MILLIGRAM(S): at 13:44

## 2018-02-07 RX ADMIN — Medication 120 MILLIGRAM(S): at 06:02

## 2018-02-07 NOTE — PHYSICAL THERAPY INITIAL EVALUATION ADULT - PASSIVE RANGE OF MOTION EXAMINATION, REHAB EVAL
RUE not assessed due to lytic lesions, + grimacing noted when PROM performed/no Passive ROM deficits were identified

## 2018-02-07 NOTE — PHYSICAL THERAPY INITIAL EVALUATION ADULT - MODALITIES TREATMENT COMMENTS
Pt is on a e[employer]a P500 low air loss surface. No wounds. MASD identified. Pt at risk for development of IAD & pressure ulcers due to incontinence & immobility.

## 2018-02-07 NOTE — CONSULT NOTE ADULT - SUBJECTIVE AND OBJECTIVE BOX
Information from chart:  "Patient is a 86y old  Female who presents with a chief complaint of Sent in from [facility] for hypercalcemia (06 Feb 2018 23:06)    HPI:  85 year old woman with PMH of Breast Ca with osteolytic bone mets, HTN, HLD and Afib on coumadin sent from [facility] for hypercalcemia.  Pt offers no complaints and was sent over by PMD.  Recently fractured right arm.  Admission done on behalf of PMD.      In the ED, Ca found to be 12.3, rest of BMP consistent with dehydration.  Ionized Ca pending.  Started on IVF and given 1 dose IV pamidronate in ED. (06 Feb 2018 23:06)   "  Recent diagnosis of breast Ca with suspected bone metastatic lesions      PAST MEDICAL & SURGICAL HISTORY:  Morbid obesity  Aortic stenosis: moderate  Atrial fibrillation  Spinal stenosis  HLD (hyperlipidemia)  HTN (hypertension)  Asthma  Lipoma  Hx of hysterectomy    FAMILY HISTORY:  No pertinent family history in first degree relatives    Allergies    menthol topical (Unknown)  sulfa (Unknown)    Intolerances      Home Medications:  Advair Diskus 250 mcg-50 mcg inhalation powder: 1 puff(s) inhaled 2 times a day (20 Oct 2014 19:06)  aspirin 81 mg oral delayed release tablet: 1 tab(s) orally once a day (21 Oct 2014 16:51)  Calcium 600+D 600 mg-200 units oral tablet:  orally once a day (20 Oct 2014 18:16)  diltiazem 120 mg/24 hours oral capsule, extended release: 1 cap(s) orally once a day (24 Oct 2014 12:07)  Briseyda-C 1000 mg oral tablet: 1 tab(s) orally once a day (20 Oct 2014 18:15)  furosemide 40 mg oral tablet: 1 tab(s) orally 2 times a day (21 Oct 2014 16:51)  K-Dur 10:    (20 Oct 2014 18:13)  losartan 100 mg oral tablet: 1 tab(s) orally once a day (23 Oct 2014 10:22)  losartan 100 mg oral tablet: 1 tab(s) orally once a day (21 Oct 2014 16:51)  metoprolol succinate 100 mg oral tablet, extended release: 1 tab(s) orally once a day (21 Oct 2014 16:51)  Multiple Vitamins oral tablet: 1 tab(s) orally once a day (21 Oct 2014 16:51)  omeprazole 20 mg oral delayed release capsule: 1 cap(s) orally once a day (20 Oct 2014 18:12)  pravastatin 20 mg oral tablet: 1 tab(s) orally once a day (at bedtime) (20 Oct 2014 18:19)  sertraline 25 mg oral tablet: 3 tab(s) orally once a day (24 Oct 2014 11:07)  warfarin 3 mg oral tablet: 1 tab(s) orally once a day (at bedtime).  You will need to check your INR/coumadin level by Monday for further recommendations on dosage. (24 Oct 2014 11:07)    MEDICATIONS  (STANDING):  aspirin enteric coated 81 milliGRAM(s) Oral daily  atorvastatin 10 milliGRAM(s) Oral at bedtime  buDESOnide 160 MICROgram(s)/formoterol 4.5 MICROgram(s) Inhaler 2 Puff(s) Inhalation two times a day  diltiazem    milliGRAM(s) Oral daily  furosemide    Tablet 40 milliGRAM(s) Oral two times a day  losartan 100 milliGRAM(s) Oral daily  metoprolol succinate  milliGRAM(s) Oral daily  pantoprazole    Tablet 40 milliGRAM(s) Oral before breakfast  sertraline 75 milliGRAM(s) Oral daily  sodium chloride 0.9%. 1000 milliLiter(s) (100 mL/Hr) IV Continuous <Continuous>  warfarin 7.5 milliGRAM(s) Oral once    MEDICATIONS  (PRN):  ALBUTerol    90 MICROgram(s) HFA Inhaler 2 Puff(s) Inhalation every 6 hours PRN Shortness of Breath and/or Wheezing  oxyCODONE    5 mG/acetaminophen 325 mG 1 Tablet(s) Oral every 6 hours PRN Severe Pain (7 - 10)    Vital Signs Last 24 Hrs  T(C): 36.7 (07 Feb 2018 10:48), Max: 37.1 (07 Feb 2018 01:13)  T(F): 98 (07 Feb 2018 10:48), Max: 98.7 (07 Feb 2018 01:13)  HR: 60 (07 Feb 2018 10:48) (60 - 74)  BP: 149/69 (07 Feb 2018 10:48) (138/65 - 179/65)  BP(mean): --  RR: 20 (07 Feb 2018 10:48) (18 - 20)  SpO2: 97% (07 Feb 2018 10:48) (97% - 99%)    Daily Height in cm: 160 (07 Feb 2018 10:48)    Daily     CAPILLARY BLOOD GLUCOSE        PHYSICAL EXAM:      T(C): 36.7 (02-07-18 @ 10:48), Max: 37.1 (02-07-18 @ 01:13)  HR: 60 (02-07-18 @ 10:48) (60 - 74)  BP: 149/69 (02-07-18 @ 10:48) (138/65 - 179/65)  RR: 20 (02-07-18 @ 10:48) (18 - 20)  SpO2: 97% (02-07-18 @ 10:48) (97% - 99%)  Wt(kg): --  Respiratory: clear anteriorly, decreased BS at bases  Cardiovascular: S1 S2  Gastrointestinal: soft NT ND +BS  Extremities:  chronic erythema              02-07    139  |  95<L>  |  13  ----------------------------<  128<H>  3.6   |  40<H>  |  0.54    Ca    11.6<H>      07 Feb 2018 08:09    TPro  6.8  /  Alb  2.5<L>  /  TBili  0.4  /  DBili  x   /  AST  11<L>  /  ALT  15  /  AlkPhos  73  02-07                          11.5   8.55  )-----------( 216      ( 07 Feb 2018 08:09 )             36.3       Assessment and Plan    Hypercalcemia, suspected bone mets; however, noted PTH of 93; normal alk phos;  Follow phos level;  IVF; Pamidronate given.  Will follow. Information from chart:  "Patient is a 86y old  Female who presents with a chief complaint of Sent in from [facility] for hypercalcemia (06 Feb 2018 23:06)    HPI:  85 year old woman with PMH of Breast Ca with osteolytic bone mets, HTN, HLD and Afib on coumadin sent from [facility] for hypercalcemia.  Pt offers no complaints and was sent over by PMD.  Recently fractured right arm.  Admission done on behalf of PMD.      In the ED, Ca found to be 12.3, rest of BMP consistent with dehydration.  Ionized Ca pending.  Started on IVF and given 1 dose IV pamidronate in ED. (06 Feb 2018 23:06)   "  Recent diagnosis of breast Ca with suspected bone metastatic lesions      PAST MEDICAL & SURGICAL HISTORY:  Morbid obesity  Aortic stenosis: moderate  Atrial fibrillation  Spinal stenosis  HLD (hyperlipidemia)  HTN (hypertension)  Asthma  Lipoma  Hx of hysterectomy    FAMILY HISTORY:  No pertinent family history in first degree relatives    Allergies    menthol topical (Unknown)  sulfa (Unknown)    Intolerances      Home Medications:  Advair Diskus 250 mcg-50 mcg inhalation powder: 1 puff(s) inhaled 2 times a day (20 Oct 2014 19:06)  aspirin 81 mg oral delayed release tablet: 1 tab(s) orally once a day (21 Oct 2014 16:51)  Calcium 600+D 600 mg-200 units oral tablet:  orally once a day (20 Oct 2014 18:16)  diltiazem 120 mg/24 hours oral capsule, extended release: 1 cap(s) orally once a day (24 Oct 2014 12:07)  Briseyda-C 1000 mg oral tablet: 1 tab(s) orally once a day (20 Oct 2014 18:15)  furosemide 40 mg oral tablet: 1 tab(s) orally 2 times a day (21 Oct 2014 16:51)  K-Dur 10:    (20 Oct 2014 18:13)  losartan 100 mg oral tablet: 1 tab(s) orally once a day (23 Oct 2014 10:22)  losartan 100 mg oral tablet: 1 tab(s) orally once a day (21 Oct 2014 16:51)  metoprolol succinate 100 mg oral tablet, extended release: 1 tab(s) orally once a day (21 Oct 2014 16:51)  Multiple Vitamins oral tablet: 1 tab(s) orally once a day (21 Oct 2014 16:51)  omeprazole 20 mg oral delayed release capsule: 1 cap(s) orally once a day (20 Oct 2014 18:12)  pravastatin 20 mg oral tablet: 1 tab(s) orally once a day (at bedtime) (20 Oct 2014 18:19)  sertraline 25 mg oral tablet: 3 tab(s) orally once a day (24 Oct 2014 11:07)  warfarin 3 mg oral tablet: 1 tab(s) orally once a day (at bedtime).  You will need to check your INR/coumadin level by Monday for further recommendations on dosage. (24 Oct 2014 11:07)    MEDICATIONS  (STANDING):  aspirin enteric coated 81 milliGRAM(s) Oral daily  atorvastatin 10 milliGRAM(s) Oral at bedtime  buDESOnide 160 MICROgram(s)/formoterol 4.5 MICROgram(s) Inhaler 2 Puff(s) Inhalation two times a day  diltiazem    milliGRAM(s) Oral daily  furosemide    Tablet 40 milliGRAM(s) Oral two times a day  losartan 100 milliGRAM(s) Oral daily  metoprolol succinate  milliGRAM(s) Oral daily  pantoprazole    Tablet 40 milliGRAM(s) Oral before breakfast  sertraline 75 milliGRAM(s) Oral daily  sodium chloride 0.9%. 1000 milliLiter(s) (100 mL/Hr) IV Continuous <Continuous>  warfarin 7.5 milliGRAM(s) Oral once    MEDICATIONS  (PRN):  ALBUTerol    90 MICROgram(s) HFA Inhaler 2 Puff(s) Inhalation every 6 hours PRN Shortness of Breath and/or Wheezing  oxyCODONE    5 mG/acetaminophen 325 mG 1 Tablet(s) Oral every 6 hours PRN Severe Pain (7 - 10)    Vital Signs Last 24 Hrs  T(C): 36.7 (07 Feb 2018 10:48), Max: 37.1 (07 Feb 2018 01:13)  T(F): 98 (07 Feb 2018 10:48), Max: 98.7 (07 Feb 2018 01:13)  HR: 60 (07 Feb 2018 10:48) (60 - 74)  BP: 149/69 (07 Feb 2018 10:48) (138/65 - 179/65)  BP(mean): --  RR: 20 (07 Feb 2018 10:48) (18 - 20)  SpO2: 97% (07 Feb 2018 10:48) (97% - 99%)    Daily Height in cm: 160 (07 Feb 2018 10:48)    Daily     CAPILLARY BLOOD GLUCOSE        PHYSICAL EXAM:      T(C): 36.7 (02-07-18 @ 10:48), Max: 37.1 (02-07-18 @ 01:13)  HR: 60 (02-07-18 @ 10:48) (60 - 74)  BP: 149/69 (02-07-18 @ 10:48) (138/65 - 179/65)  RR: 20 (02-07-18 @ 10:48) (18 - 20)  SpO2: 97% (02-07-18 @ 10:48) (97% - 99%)  Wt(kg): --  Respiratory: clear anteriorly, decreased BS at bases  Cardiovascular: S1 S2  Gastrointestinal: soft NT ND +BS  Extremities:  chronic erythema              02-07    139  |  95<L>  |  13  ----------------------------<  128<H>  3.6   |  40<H>  |  0.54    Ca    11.6<H>      07 Feb 2018 08:09    TPro  6.8  /  Alb  2.5<L>  /  TBili  0.4  /  DBili  x   /  AST  11<L>  /  ALT  15  /  AlkPhos  73  02-07                          11.5   8.55  )-----------( 216      ( 07 Feb 2018 08:09 )             36.3       Assessment and Plan    Hypercalcemia, suspected bone mets; however, noted PTH of 93; normal alk phos;  Possible contraction alkalosis vs respiratory acidosis, compensation.  Follow Venous blood gas;  Follow phos level;  IVF; Pamidronate given.  Will follow. Information from chart:  "Patient is a 86y old  Female who presents with a chief complaint of Sent in from [facility] for hypercalcemia (06 Feb 2018 23:06)    HPI:  85 year old woman with PMH of Breast Ca with osteolytic bone mets, HTN, HLD and Afib on coumadin sent from [facility] for hypercalcemia.  Pt offers no complaints and was sent over by PMD.  Recently fractured right arm.  Admission done on behalf of PMD.      In the ED, Ca found to be 12.3, rest of BMP consistent with dehydration.  Ionized Ca pending.  Started on IVF and given 1 dose IV pamidronate in ED. (06 Feb 2018 23:06)   "  Recent diagnosis of breast Ca with suspected bone metastatic lesions.  Noted renal and adrenal mass;       PAST MEDICAL & SURGICAL HISTORY:  Morbid obesity  Aortic stenosis: moderate  Atrial fibrillation  Spinal stenosis  HLD (hyperlipidemia)  HTN (hypertension)  Asthma  Lipoma  Hx of hysterectomy    FAMILY HISTORY:  No pertinent family history in first degree relatives    Allergies    menthol topical (Unknown)  sulfa (Unknown)    Intolerances      Home Medications:  Advair Diskus 250 mcg-50 mcg inhalation powder: 1 puff(s) inhaled 2 times a day (20 Oct 2014 19:06)  aspirin 81 mg oral delayed release tablet: 1 tab(s) orally once a day (21 Oct 2014 16:51)  Calcium 600+D 600 mg-200 units oral tablet:  orally once a day (20 Oct 2014 18:16)  diltiazem 120 mg/24 hours oral capsule, extended release: 1 cap(s) orally once a day (24 Oct 2014 12:07)  Briseyda-C 1000 mg oral tablet: 1 tab(s) orally once a day (20 Oct 2014 18:15)  furosemide 40 mg oral tablet: 1 tab(s) orally 2 times a day (21 Oct 2014 16:51)  K-Dur 10:    (20 Oct 2014 18:13)  losartan 100 mg oral tablet: 1 tab(s) orally once a day (23 Oct 2014 10:22)  losartan 100 mg oral tablet: 1 tab(s) orally once a day (21 Oct 2014 16:51)  metoprolol succinate 100 mg oral tablet, extended release: 1 tab(s) orally once a day (21 Oct 2014 16:51)  Multiple Vitamins oral tablet: 1 tab(s) orally once a day (21 Oct 2014 16:51)  omeprazole 20 mg oral delayed release capsule: 1 cap(s) orally once a day (20 Oct 2014 18:12)  pravastatin 20 mg oral tablet: 1 tab(s) orally once a day (at bedtime) (20 Oct 2014 18:19)  sertraline 25 mg oral tablet: 3 tab(s) orally once a day (24 Oct 2014 11:07)  warfarin 3 mg oral tablet: 1 tab(s) orally once a day (at bedtime).  You will need to check your INR/coumadin level by Monday for further recommendations on dosage. (24 Oct 2014 11:07)    MEDICATIONS  (STANDING):  aspirin enteric coated 81 milliGRAM(s) Oral daily  atorvastatin 10 milliGRAM(s) Oral at bedtime  buDESOnide 160 MICROgram(s)/formoterol 4.5 MICROgram(s) Inhaler 2 Puff(s) Inhalation two times a day  diltiazem    milliGRAM(s) Oral daily  furosemide    Tablet 40 milliGRAM(s) Oral two times a day  losartan 100 milliGRAM(s) Oral daily  metoprolol succinate  milliGRAM(s) Oral daily  pantoprazole    Tablet 40 milliGRAM(s) Oral before breakfast  sertraline 75 milliGRAM(s) Oral daily  sodium chloride 0.9%. 1000 milliLiter(s) (100 mL/Hr) IV Continuous <Continuous>  warfarin 7.5 milliGRAM(s) Oral once    MEDICATIONS  (PRN):  ALBUTerol    90 MICROgram(s) HFA Inhaler 2 Puff(s) Inhalation every 6 hours PRN Shortness of Breath and/or Wheezing  oxyCODONE    5 mG/acetaminophen 325 mG 1 Tablet(s) Oral every 6 hours PRN Severe Pain (7 - 10)    Vital Signs Last 24 Hrs  T(C): 36.7 (07 Feb 2018 10:48), Max: 37.1 (07 Feb 2018 01:13)  T(F): 98 (07 Feb 2018 10:48), Max: 98.7 (07 Feb 2018 01:13)  HR: 60 (07 Feb 2018 10:48) (60 - 74)  BP: 149/69 (07 Feb 2018 10:48) (138/65 - 179/65)  BP(mean): --  RR: 20 (07 Feb 2018 10:48) (18 - 20)  SpO2: 97% (07 Feb 2018 10:48) (97% - 99%)    Daily Height in cm: 160 (07 Feb 2018 10:48)    Daily     CAPILLARY BLOOD GLUCOSE        PHYSICAL EXAM:      T(C): 36.7 (02-07-18 @ 10:48), Max: 37.1 (02-07-18 @ 01:13)  HR: 60 (02-07-18 @ 10:48) (60 - 74)  BP: 149/69 (02-07-18 @ 10:48) (138/65 - 179/65)  RR: 20 (02-07-18 @ 10:48) (18 - 20)  SpO2: 97% (02-07-18 @ 10:48) (97% - 99%)  Wt(kg): --  Respiratory: clear anteriorly, decreased BS at bases  Cardiovascular: S1 S2  Gastrointestinal: soft NT ND +BS  Extremities:  chronic erythema              02-07    139  |  95<L>  |  13  ----------------------------<  128<H>  3.6   |  40<H>  |  0.54    Ca    11.6<H>      07 Feb 2018 08:09    TPro  6.8  /  Alb  2.5<L>  /  TBili  0.4  /  DBili  x   /  AST  11<L>  /  ALT  15  /  AlkPhos  73  02-07                          11.5   8.55  )-----------( 216      ( 07 Feb 2018 08:09 )             36.3       Assessment and Plan    Hypercalcemia, suspected bone mets; however, noted PTH of 93; normal alk phos;  Possible contraction alkalosis vs respiratory acidosis, compensation.  Follow Venous blood gas;  Follow phos level;  IVF; Pamidronate given.  Considering age and comorbid status, would defer workup up renal mass and adrenal mass for now.  Will follow.

## 2018-02-07 NOTE — PROGRESS NOTE ADULT - SUBJECTIVE AND OBJECTIVE BOX
Patient is a 86y old  Female who presents with a chief complaint of Sent in from [facility] for hypercalcemia (06 Feb 2018 23:06)      INTERVAL HPI/OVERNIGHT EVENTS:    MEDICATIONS  (STANDING):  aspirin enteric coated 81 milliGRAM(s) Oral daily  atorvastatin 10 milliGRAM(s) Oral at bedtime  buDESOnide 160 MICROgram(s)/formoterol 4.5 MICROgram(s) Inhaler 2 Puff(s) Inhalation two times a day  diltiazem    milliGRAM(s) Oral daily  losartan 100 milliGRAM(s) Oral daily  metoprolol succinate  milliGRAM(s) Oral daily  pantoprazole    Tablet 40 milliGRAM(s) Oral before breakfast  petrolatum white Ointment 1 Application(s) Topical two times a day  sertraline 75 milliGRAM(s) Oral daily  sodium chloride 0.9%. 1000 milliLiter(s) (100 mL/Hr) IV Continuous <Continuous>    MEDICATIONS  (PRN):  ALBUTerol    90 MICROgram(s) HFA Inhaler 2 Puff(s) Inhalation every 6 hours PRN Shortness of Breath and/or Wheezing  oxyCODONE    5 mG/acetaminophen 325 mG 1 Tablet(s) Oral every 6 hours PRN Severe Pain (7 - 10)      Allergies    menthol topical (Unknown)  sulfa (Unknown)    Intolerances        REVIEW OF SYSTEMS:        HEENT - wnl  RESPIRATORY: No cough, wheezing, chills or hemoptysis; No shortness of breath  CARDIOVASCULAR: No chest pain, palpitations, dizziness, or leg swelling  GASTROINTESTINAL: No abdominal or epigastric pain. No nausea, vomiting, or hematemesis; No diarrhea or constipation. No melena or hematochezia.  GENITOURINARY: No dysuria, frequency, hematuria, or incontinence  SKIN: No itching, burning, rashes, or lesions   MUSCULOSKELETAL: No joint pain or swelling; No muscle, back, or extremity pain  PSYCHIATRIC: No depression, anxiety, mood swings, or difficulty sleeping        Vital Signs Last 24 Hrs  T(C): 36.3 (07 Feb 2018 17:17), Max: 37.1 (07 Feb 2018 01:13)  T(F): 97.4 (07 Feb 2018 17:17), Max: 98.7 (07 Feb 2018 01:13)  HR: 67 (07 Feb 2018 17:17) (60 - 74)  BP: 149/72 (07 Feb 2018 17:17) (138/65 - 179/65)  BP(mean): --  RR: 17 (07 Feb 2018 17:17) (17 - 20)  SpO2: 99% (07 Feb 2018 17:17) (96% - 99%)    PHYSICAL EXAM:  general       HEENT wnl  CHEST/LUNG: Clear to percussion bilaterally; No rales, rhonchi, wheezing, or rubs  HEART: Regular rate and rhythm; No murmurs, rubs, or gallops  ABDOMEN: Soft, Nontender, Nondistended; Bowel sounds present  EXTREMITIES:  2+ Peripheral Pulses, No clubbing, cyanosis, or edema  LYMPH: No lymphadenopathy noted  SKIN: No rashes or lesions    LABS:                        11.5   8.55  )-----------( 216      ( 07 Feb 2018 08:09 )             36.3     02-07    139  |  95<L>  |  13  ----------------------------<  128<H>  3.6   |  40<H>  |  0.54    Ca    11.1<H>      07 Feb 2018 20:24    TPro  6.8  /  Alb  2.5<L>  /  TBili  0.4  /  DBili  x   /  AST  11<L>  /  ALT  15  /  AlkPhos  73  02-07    PT/INR - ( 07 Feb 2018 08:09 )   PT: 22.1 sec;   INR: 2.00 ratio             CAPILLARY BLOOD GLUCOSE                    RADIOLOGY & ADDITIONAL TESTS:    Imaging Personally Reviewed:  [y ] YES  [ ] NO    Consultant(s) Notes Reviewed:  [y ] YES  [ ] NO    Care Discussed with Consultants/Other Providers [ ] YES  [ ] NO    PROBLEMS:  HYPERKALCEMIA  HYPERCALCEMIA  Hypercalcemia  Preventive measure  Morbid obesity due to excess calories  Mixed hyperlipidemia  Mild intermittent asthma without complication  Primary hypertension  Chronic atrial fibrillation  Metastatic cancer  Hypercalcemia      Care discussed with family,         [v  ]   yes  [  ]  No    imp:    stable[ ]    unstable[  ]     improving [   ]       unchanged  [  ]                Plans:  Continue present plans  [  ]

## 2018-02-07 NOTE — PROGRESS NOTE ADULT - SUBJECTIVE AND OBJECTIVE BOX
Patient is a 86y old  Female who presents with a chief complaint of Sent in from [facility] for hypercalcemia (06 Feb 2018 23:06)      INTERVAL HPI/OVERNIGHT EVENTS:    MEDICATIONS  (STANDING):  aspirin enteric coated 81 milliGRAM(s) Oral daily  atorvastatin 10 milliGRAM(s) Oral at bedtime  buDESOnide 160 MICROgram(s)/formoterol 4.5 MICROgram(s) Inhaler 2 Puff(s) Inhalation two times a day  diltiazem    milliGRAM(s) Oral daily  furosemide    Tablet 40 milliGRAM(s) Oral two times a day  losartan 100 milliGRAM(s) Oral daily  metoprolol succinate  milliGRAM(s) Oral daily  pamidronate IVPB 60 milliGRAM(s) IV Intermittent once  pantoprazole    Tablet 40 milliGRAM(s) Oral before breakfast  sertraline 75 milliGRAM(s) Oral daily  sodium chloride 0.9%. 1000 milliLiter(s) (100 mL/Hr) IV Continuous <Continuous>  warfarin 7.5 milliGRAM(s) Oral once    MEDICATIONS  (PRN):  ALBUTerol    90 MICROgram(s) HFA Inhaler 2 Puff(s) Inhalation every 6 hours PRN Shortness of Breath and/or Wheezing  oxyCODONE    5 mG/acetaminophen 325 mG 1 Tablet(s) Oral every 6 hours PRN Severe Pain (7 - 10)      Allergies    menthol topical (Unknown)  sulfa (Unknown)    Intolerances        REVIEW OF SYSTEMS:        HEENT - wnl  RESPIRATORY: No cough, wheezing, chills or hemoptysis; No shortness of breath  CARDIOVASCULAR: No chest pain, palpitations, dizziness, or leg swelling  GASTROINTESTINAL: No abdominal or epigastric pain. No nausea, vomiting, or hematemesis; No diarrhea or constipation. No melena or hematochezia.  GENITOURINARY: No dysuria, frequency, hematuria, or incontinence  SKIN: No itching, burning, rashes, or lesions   MUSCULOSKELETAL: No joint pain or swelling; No muscle, back, or extremity pain  PSYCHIATRIC: No depression, anxiety, mood swings, or difficulty sleeping        Vital Signs Last 24 Hrs  T(C): 36.7 (07 Feb 2018 10:48), Max: 37.1 (07 Feb 2018 01:13)  T(F): 98 (07 Feb 2018 10:48), Max: 98.7 (07 Feb 2018 01:13)  HR: 60 (07 Feb 2018 10:48) (60 - 74)  BP: 149/69 (07 Feb 2018 10:48) (138/65 - 179/65)  BP(mean): --  RR: 20 (07 Feb 2018 10:48) (18 - 20)  SpO2: 97% (07 Feb 2018 10:48) (97% - 99%)    PHYSICAL EXAM:  general       HEENT wnl  CHEST/LUNG: Clear to percussion bilaterally; No rales, rhonchi, wheezing, or rubs  HEART: Regular rate and rhythm; No murmurs, rubs, or gallops  ABDOMEN: Soft, Nontender, Nondistended; Bowel sounds present  EXTREMITIES:  2+ Peripheral Pulses, No clubbing, cyanosis, or edema  LYMPH: No lymphadenopathy noted  SKIN: No rashes or lesions    LABS:                        11.5   8.55  )-----------( 216      ( 07 Feb 2018 08:09 )             36.3     02-07    139  |  95<L>  |  13  ----------------------------<  128<H>  3.6   |  40<H>  |  0.54    Ca    11.6<H>      07 Feb 2018 08:09    TPro  6.8  /  Alb  2.5<L>  /  TBili  0.4  /  DBili  x   /  AST  11<L>  /  ALT  15  /  AlkPhos  73  02-07    PT/INR - ( 07 Feb 2018 08:09 )   PT: 22.1 sec;   INR: 2.00 ratio             CAPILLARY BLOOD GLUCOSE                    RADIOLOGY & ADDITIONAL TESTS:    Imaging Personally Reviewed:  [y ] YES  [ ] NO    Consultant(s) Notes Reviewed:  [y ] YES  [ ] NO    Care Discussed with Consultants/Other Providers [ ] YES  [ ] NO    PROBLEMS:  HYPERKALCEMIA  HYPERCALCEMIA  Hypercalcemia  Preventive measure  Morbid obesity due to excess calories  Mixed hyperlipidemia  Mild intermittent asthma without complication  Primary hypertension  Chronic atrial fibrillation  Metastatic cancer  Hypercalcemia  Metastatic Cance      Care discussed with family,         [ v ]   yes  [  ]  No    imp:    stable[ ]    unstable[  ]     improving [ v  ]       unchanged  [  ]                Plans:  Aredia 60mg IVPB x 8 hours BMP AM  PT eval

## 2018-02-07 NOTE — PHYSICAL THERAPY INITIAL EVALUATION ADULT - PERTINENT HX OF CURRENT PROBLEM, REHAB EVAL
Pt is an 85yo F admitted from [facility] secondary to hyperkalemia. INR = 2/0, Calcium = 12.4.

## 2018-02-07 NOTE — PHYSICAL THERAPY INITIAL EVALUATION ADULT - PRECAUTIONS/LIMITATIONS, REHAB EVAL
2L via nasal cannula/oxygen therapy device and L/min/hearing precautions/vision precautions/fall precautions

## 2018-02-07 NOTE — PHYSICAL THERAPY INITIAL EVALUATION ADULT - PLANNED THERAPY INTERVENTIONS, PT EVAL
postural re-education/ROM/bed mobility training/balance training/strengthening/manual therapy techniques/transfer training

## 2018-02-07 NOTE — PHYSICAL THERAPY INITIAL EVALUATION ADULT - GENERAL OBSERVATIONS, REHAB EVAL
Pt encountered supine in bed + splint over RUE, O2 via nasal cannula at 2L/min, IV LUE, daughter (Awais Dodson) at bedside

## 2018-02-07 NOTE — PHYSICAL THERAPY INITIAL EVALUATION ADULT - IMPAIRMENTS FOUND, PT EVAL
aerobic capacity/endurance/ROM/bed mobility, transfers/gross motor/muscle strength/arousal, attention, and cognition/fine motor/gait, locomotion, and balance

## 2018-02-08 ENCOUNTER — TRANSCRIPTION ENCOUNTER (OUTPATIENT)
Age: 83
End: 2018-02-08

## 2018-02-08 LAB
24R-OH-CALCIDIOL SERPL-MCNC: 32.4 NG/ML — SIGNIFICANT CHANGE UP (ref 30–80)
ANION GAP SERPL CALC-SCNC: 1 MMOL/L — LOW (ref 5–17)
BUN SERPL-MCNC: 11 MG/DL — SIGNIFICANT CHANGE UP (ref 7–23)
CALCIUM SERPL-MCNC: 10.5 MG/DL — HIGH (ref 8.5–10.1)
CHLORIDE SERPL-SCNC: 100 MMOL/L — SIGNIFICANT CHANGE UP (ref 96–108)
CO2 SERPL-SCNC: 40 MMOL/L — HIGH (ref 22–31)
CREAT SERPL-MCNC: 0.49 MG/DL — LOW (ref 0.5–1.3)
GLUCOSE SERPL-MCNC: 98 MG/DL — SIGNIFICANT CHANGE UP (ref 70–99)
INR BLD: 2.1 RATIO — HIGH (ref 0.88–1.16)
PHOSPHATE SERPL-MCNC: 2.2 MG/DL — LOW (ref 2.5–4.5)
POTASSIUM SERPL-MCNC: 3.3 MMOL/L — LOW (ref 3.5–5.3)
POTASSIUM SERPL-SCNC: 3.3 MMOL/L — LOW (ref 3.5–5.3)
PROTHROM AB SERPL-ACNC: 23.2 SEC — HIGH (ref 9.8–12.7)
SODIUM SERPL-SCNC: 141 MMOL/L — SIGNIFICANT CHANGE UP (ref 135–145)
VIT D25+D1,25 OH+D1,25 PNL SERPL-MCNC: 17.3 PG/ML — LOW (ref 19.9–79.3)

## 2018-02-08 RX ORDER — DILTIAZEM HCL 120 MG
1 CAPSULE, EXT RELEASE 24 HR ORAL
Qty: 0 | Refills: 0 | COMMUNITY
Start: 2018-02-08

## 2018-02-08 RX ORDER — WARFARIN SODIUM 2.5 MG/1
3 TABLET ORAL ONCE
Qty: 0 | Refills: 0 | Status: COMPLETED | OUTPATIENT
Start: 2018-02-08 | End: 2018-02-08

## 2018-02-08 RX ORDER — ASPIRIN/CALCIUM CARB/MAGNESIUM 324 MG
1 TABLET ORAL
Qty: 0 | Refills: 0 | DISCHARGE
Start: 2018-02-08

## 2018-02-08 RX ORDER — SODIUM CHLORIDE 9 MG/ML
1000 INJECTION INTRAMUSCULAR; INTRAVENOUS; SUBCUTANEOUS
Qty: 0 | Refills: 0 | Status: DISCONTINUED | OUTPATIENT
Start: 2018-02-08 | End: 2018-02-08

## 2018-02-08 RX ORDER — LOSARTAN POTASSIUM 100 MG/1
1 TABLET, FILM COATED ORAL
Qty: 0 | Refills: 0 | COMMUNITY
Start: 2018-02-08

## 2018-02-08 RX ORDER — SERTRALINE 25 MG/1
4 TABLET, FILM COATED ORAL
Qty: 0 | Refills: 0 | DISCHARGE
Start: 2018-02-08

## 2018-02-08 RX ORDER — METOPROLOL TARTRATE 50 MG
1 TABLET ORAL
Qty: 0 | Refills: 0 | COMMUNITY
Start: 2018-02-08

## 2018-02-08 RX ORDER — SERTRALINE 25 MG/1
3 TABLET, FILM COATED ORAL
Qty: 0 | Refills: 0 | DISCHARGE
Start: 2018-02-08

## 2018-02-08 RX ORDER — POTASSIUM PHOSPHATE, MONOBASIC POTASSIUM PHOSPHATE, DIBASIC 236; 224 MG/ML; MG/ML
15 INJECTION, SOLUTION INTRAVENOUS ONCE
Qty: 0 | Refills: 0 | Status: COMPLETED | OUTPATIENT
Start: 2018-02-08 | End: 2018-02-08

## 2018-02-08 RX ORDER — POTASSIUM CHLORIDE 20 MEQ
20 PACKET (EA) ORAL
Qty: 0 | Refills: 0 | Status: COMPLETED | OUTPATIENT
Start: 2018-02-08 | End: 2018-02-08

## 2018-02-08 RX ADMIN — Medication 1 APPLICATION(S): at 17:31

## 2018-02-08 RX ADMIN — SERTRALINE 75 MILLIGRAM(S): 25 TABLET, FILM COATED ORAL at 22:49

## 2018-02-08 RX ADMIN — Medication 100 MILLIGRAM(S): at 05:22

## 2018-02-08 RX ADMIN — BUDESONIDE AND FORMOTEROL FUMARATE DIHYDRATE 2 PUFF(S): 160; 4.5 AEROSOL RESPIRATORY (INHALATION) at 17:31

## 2018-02-08 RX ADMIN — SODIUM CHLORIDE 50 MILLILITER(S): 9 INJECTION INTRAMUSCULAR; INTRAVENOUS; SUBCUTANEOUS at 12:37

## 2018-02-08 RX ADMIN — ATORVASTATIN CALCIUM 10 MILLIGRAM(S): 80 TABLET, FILM COATED ORAL at 22:49

## 2018-02-08 RX ADMIN — Medication 81 MILLIGRAM(S): at 12:38

## 2018-02-08 RX ADMIN — Medication 20 MILLIEQUIVALENT(S): at 08:52

## 2018-02-08 RX ADMIN — LOSARTAN POTASSIUM 100 MILLIGRAM(S): 100 TABLET, FILM COATED ORAL at 05:22

## 2018-02-08 RX ADMIN — Medication 20 MILLIEQUIVALENT(S): at 10:07

## 2018-02-08 RX ADMIN — Medication 120 MILLIGRAM(S): at 05:22

## 2018-02-08 RX ADMIN — PANTOPRAZOLE SODIUM 40 MILLIGRAM(S): 20 TABLET, DELAYED RELEASE ORAL at 07:49

## 2018-02-08 RX ADMIN — Medication 1 APPLICATION(S): at 05:23

## 2018-02-08 RX ADMIN — WARFARIN SODIUM 3 MILLIGRAM(S): 2.5 TABLET ORAL at 22:49

## 2018-02-08 RX ADMIN — BUDESONIDE AND FORMOTEROL FUMARATE DIHYDRATE 2 PUFF(S): 160; 4.5 AEROSOL RESPIRATORY (INHALATION) at 05:22

## 2018-02-08 RX ADMIN — POTASSIUM PHOSPHATE, MONOBASIC POTASSIUM PHOSPHATE, DIBASIC 63.75 MILLIMOLE(S): 236; 224 INJECTION, SOLUTION INTRAVENOUS at 08:59

## 2018-02-08 NOTE — DIETITIAN INITIAL EVALUATION ADULT. - PERTINENT MEDS FT
ecotrin, NS @ 100 ml/hr, albuterol, lipitor, symbicort, cardizem CD, cozaar, toprol XL, protonix , zoloft

## 2018-02-08 NOTE — PROGRESS NOTE ADULT - SUBJECTIVE AND OBJECTIVE BOX
Patient is a 86y old  Female who presents with a chief complaint of Sent in from [facility] for hypercalcemia (08 Feb 2018 12:31)      INTERVAL HPI/OVERNIGHT EVENTS:    MEDICATIONS  (STANDING):  aspirin enteric coated 81 milliGRAM(s) Oral daily  atorvastatin 10 milliGRAM(s) Oral at bedtime  buDESOnide 160 MICROgram(s)/formoterol 4.5 MICROgram(s) Inhaler 2 Puff(s) Inhalation two times a day  diltiazem    milliGRAM(s) Oral daily  losartan 100 milliGRAM(s) Oral daily  metoprolol succinate  milliGRAM(s) Oral daily  pantoprazole    Tablet 40 milliGRAM(s) Oral before breakfast  petrolatum white Ointment 1 Application(s) Topical two times a day  sertraline 75 milliGRAM(s) Oral daily  warfarin 3 milliGRAM(s) Oral once    MEDICATIONS  (PRN):  ALBUTerol    90 MICROgram(s) HFA Inhaler 2 Puff(s) Inhalation every 6 hours PRN Shortness of Breath and/or Wheezing  oxyCODONE    5 mG/acetaminophen 325 mG 1 Tablet(s) Oral every 6 hours PRN Severe Pain (7 - 10)      Allergies    menthol topical (Unknown)  sulfa (Unknown)    Intolerances        REVIEW OF SYSTEMS:        HEENT - wnl  RESPIRATORY: No cough, wheezing, chills or hemoptysis; No shortness of breath  CARDIOVASCULAR: No chest pain, palpitations, dizziness, or leg swelling  GASTROINTESTINAL: No abdominal or epigastric pain. No nausea, vomiting, or hematemesis; No diarrhea or constipation. No melena or hematochezia.  GENITOURINARY: No dysuria, frequency, hematuria, or incontinence  SKIN: No itching, burning, rashes, or lesions   MUSCULOSKELETAL: No joint pain or swelling; No muscle, back, or extremity pain  PSYCHIATRIC: No depression, anxiety, mood swings, or difficulty sleeping        Vital Signs Last 24 Hrs  T(C): 37.1 (08 Feb 2018 17:04), Max: 37.3 (08 Feb 2018 11:31)  T(F): 98.8 (08 Feb 2018 17:04), Max: 99.2 (08 Feb 2018 11:31)  HR: 82 (08 Feb 2018 17:04) (67 - 88)  BP: 149/73 (08 Feb 2018 17:04) (142/67 - 157/74)  BP(mean): --  RR: 18 (08 Feb 2018 17:04) (17 - 20)  SpO2: 99% (08 Feb 2018 17:04) (88% - 99%)    PHYSICAL EXAM:  general       HEENT wnl  CHEST/LUNG: Clear to percussion bilaterally; No rales, rhonchi, wheezing, or rubs  HEART: Regular rate and rhythm; No murmurs, rubs, or gallops  ABDOMEN: Soft, Nontender, Nondistended; Bowel sounds present  EXTREMITIES:  2+ Peripheral Pulses,legs dermatitis 2 chronic changes, greish discoloration  LYMPH: No lymphadenopathy noted  SKIN: No rashes or lesions  Neuro CN II-XII intact no neurological deficit  LABS:                        11.5   8.55  )-----------( 216      ( 07 Feb 2018 08:09 )             36.3     02-08    141  |  100  |  11  ----------------------------<  98  3.3<L>   |  40<H>  |  0.49<L>    Ca    10.5<H>      08 Feb 2018 07:25  Phos  2.2     02-08    TPro  6.8  /  Alb  2.5<L>  /  TBili  0.4  /  DBili  x   /  AST  11<L>  /  ALT  15  /  AlkPhos  73  02-07    PT/INR - ( 08 Feb 2018 07:25 )   PT: 23.2 sec;   INR: 2.10 ratio             CAPILLARY BLOOD GLUCOSE                    RADIOLOGY & ADDITIONAL TESTS:    Imaging Personally Reviewed:  [y ] YES  [ ] NO    Consultant(s) Notes Reviewed:  [y ] YES  [ ] NO    Care Discussed with Consultants/Other Providers [ ] YES  [ ] NO    PROBLEMS:  HYPERKALCEMIA  HYPERCALCEMIA  Hypercalcemia  Preventive measure  Morbid obesity due to excess calories  Mixed hyperlipidemia  Mild intermittent asthma without complication  Primary hypertension  Chronic atrial fibrillation  Metastatic cancer  Hypercalcemia      Care discussed with family,         [v  ]   yes  [  ]  No daughter at bedside d/c discussed , agrees c plan. declined hospice eval.    imp:    stable[v ]    unstable[  ]     improving [   ]       unchanged  [  ]                Plans:  Continue present plans  [ v ]

## 2018-02-08 NOTE — CHART NOTE - NSCHARTNOTEFT_GEN_A_CORE
Hospitalist Medicine PA    Pt with noted Dx/Hx of breast mass requires total assistance with ADLs. She is dependent for mobility and transfers. He has impaired ability to control trunk, arms, legs, and impaired postural control. His medical condition requires positioning of the body in ways not feasible with an ordinary bed or chair in order to promote optimal breathing, circulation, and prevention of decubiti. Pt will require a jell-overlay to promote optimal breathing, circulation, and prevention of skin breakdown.

## 2018-02-08 NOTE — DISCHARGE NOTE ADULT - CARE PROVIDERS DIRECT ADDRESSES
,DirectAddress_Unknown,DirectAddress_Unknown,[email or web address]

## 2018-02-08 NOTE — DISCHARGE NOTE ADULT - PATIENT PORTAL LINK FT
You can access the [facility] Patient Portal, offered by [facility], by registering with the following website: [email or web address]

## 2018-02-08 NOTE — DISCHARGE NOTE ADULT - MEDICATION SUMMARY - MEDICATIONS TO TAKE
I will START or STAY ON the medications listed below when I get home from the hospital:    oxyCODONE-acetaminophen 5 mg-325 mg oral tablet  -- 1 tab(s) by mouth every 6 hours, As needed, Severe Pain (7 - 10)  -- Indication: For Pain    aspirin 81 mg oral delayed release tablet  -- 1 tab(s) by mouth once a day  -- Indication: For Preventative    losartan 100 mg oral tablet  -- 1 tab(s) by mouth once a day  -- Indication: For HTN    dilTIAZem 120 mg/24 hours oral capsule, extended release  -- 1 cap(s) by mouth once a day  -- Indication: For HTN    warfarin 3 mg oral tablet  -- 1 tab(s) by mouth once a day (at bedtime).  You will need to check your INR/coumadin level by Monday for further recommendations on dosage.  -- Indication: For A Fib    sertraline 25 mg oral tablet  -- 3 tab(s) by mouth once a day  -- Indication: For Antidepressants    pravastatin 20 mg oral tablet  -- 1 tab(s) by mouth once a day (at bedtime)  -- Indication: For HLD    metoprolol succinate 100 mg oral tablet, extended release  -- 1 tab(s) by mouth once a day  -- Indication: For HTN    Advair Diskus 250 mcg-50 mcg inhalation powder  -- 1 puff(s) inhaled 2 times a day  -- Indication: For Asthma    K-Dur 10  --     -- Indication: For Supplement    omeprazole 20 mg oral delayed release capsule  -- 1 cap(s) by mouth once a day  -- Indication: For GERD    Briseyda-C 1000 mg oral tablet  -- 1 tab(s) by mouth once a day  -- Indication: For Supplement I will START or STAY ON the medications listed below when I get home from the hospital:    oxyCODONE-acetaminophen 5 mg-325 mg oral tablet  -- 1 tab(s) by mouth every 6 hours, As needed, Severe Pain (7 - 10)  -- Indication: For Pain    aspirin 81 mg oral delayed release tablet  -- 1 tab(s) by mouth once a day  -- Indication: For Preventative    losartan 100 mg oral tablet  -- 1 tab(s) by mouth once a day  -- Indication: For HTN    dilTIAZem 120 mg/24 hours oral capsule, extended release  -- 1 cap(s) by mouth once a day  -- Indication: For HTN    warfarin 3 mg oral tablet  -- 1 tab(s) by mouth once a day (at bedtime).  You will need to check your INR/coumadin level by Monday for further recommendations on dosage.  -- Indication: For A Fib    sertraline 25 mg oral tablet  -- 3 tab(s) by mouth once a day  -- Indication: For Antidepressants    pravastatin 20 mg oral tablet  -- 1 tab(s) by mouth once a day (at bedtime)  -- Indication: For HLD    metoprolol succinate 100 mg oral tablet, extended release  -- 1 tab(s) by mouth once a day  -- Indication: For HTN    albuterol 90 mcg/inh inhalation aerosol  -- 2 puff(s) inhaled every 6 hours, As needed, Shortness of Breath and/or Wheezing  -- Indication: For SOB    Advair Diskus 250 mcg-50 mcg inhalation powder  -- 1 puff(s) inhaled 2 times a day  -- Indication: For Asthma    K-Dur 10  --     -- Indication: For Supplement    omeprazole 20 mg oral delayed release capsule  -- 1 cap(s) by mouth once a day  -- Indication: For GERD    Briseyda-C 1000 mg oral tablet  -- 1 tab(s) by mouth once a day  -- Indication: For Supplement

## 2018-02-08 NOTE — PROGRESS NOTE ADULT - SUBJECTIVE AND OBJECTIVE BOX
Ca 10.5>  PO4 <  pt with metastatic CA multiple locations   Improving hypercalcemia  CPM  Nephrology f/u  D/c planning

## 2018-02-08 NOTE — DIETITIAN INITIAL EVALUATION ADULT. - PERTINENT LABORATORY DATA
02-08 Na141 mmol/L Glu 98 mg/dL K+ 3.3 mmol/L<L> Cr  0.49 mg/dL<L> BUN 11 mg/dL Phos 2.2 mg/dL<L> Alb n/a   PAB n/a

## 2018-02-08 NOTE — CONSULT NOTE ADULT - SUBJECTIVE AND OBJECTIVE BOX
REASON FOR CONSULTATION:  Hypercalcemia.  Breast Tumor.  Metastases.    INTERVAL HISTORY:      Allergies    menthol topical (Unknown)  sulfa (Unknown)    Intolerances        MEDICATIONS  (STANDING):  aspirin enteric coated 81 milliGRAM(s) Oral daily  atorvastatin 10 milliGRAM(s) Oral at bedtime  buDESOnide 160 MICROgram(s)/formoterol 4.5 MICROgram(s) Inhaler 2 Puff(s) Inhalation two times a day  diltiazem    milliGRAM(s) Oral daily  losartan 100 milliGRAM(s) Oral daily  metoprolol succinate  milliGRAM(s) Oral daily  pantoprazole    Tablet 40 milliGRAM(s) Oral before breakfast  petrolatum white Ointment 1 Application(s) Topical two times a day  potassium chloride    Tablet ER 20 milliEquivalent(s) Oral every 2 hours  sertraline 75 milliGRAM(s) Oral daily  sodium chloride 0.9%. 1000 milliLiter(s) (100 mL/Hr) IV Continuous <Continuous>    MEDICATIONS  (PRN):  ALBUTerol    90 MICROgram(s) HFA Inhaler 2 Puff(s) Inhalation every 6 hours PRN Shortness of Breath and/or Wheezing  oxyCODONE    5 mG/acetaminophen 325 mG 1 Tablet(s) Oral every 6 hours PRN Severe Pain (7 - 10)      Vital Signs Last 24 Hrs  T(C): 37.2 (08 Feb 2018 05:23), Max: 37.2 (08 Feb 2018 05:23)  T(F): 99 (08 Feb 2018 05:23), Max: 99 (08 Feb 2018 05:23)  HR: 86 (08 Feb 2018 05:23) (60 - 86)  BP: 154/74 (08 Feb 2018 05:23) (142/67 - 154/74)  BP(mean): --  RR: 17 (08 Feb 2018 05:23) (17 - 20)  SpO2: 96% (08 Feb 2018 05:23) (96% - 99%)    PHYSICAL EXAM:  Weak  Bed Ridden.  3x3 cm mass Left Breast.  EYES: EOMI, PERRLA, conjunctiva and sclera clear  CHEST/LUNG: Clear to percussion bilaterally;   HEART: Regular rate and rhythm;   ABDOMEN: Soft,   Edema++      LABS:                        11.5   8.55  )-----------( 216      ( 07 Feb 2018 08:09 )             36.3     02-08    141  |  100  |  11  ----------------------------<  98  3.3<L>   |  40<H>  |  0.49<L>    Ca    10.5<H>      08 Feb 2018 07:25  Phos  2.2     02-08    TPro  6.8  /  Alb  2.5<L>  /  TBili  0.4  /  DBili  x   /  AST  11<L>  /  ALT  15  /  AlkPhos  73  02-07    PT/INR - ( 08 Feb 2018 07:25 )   PT: 23.2 sec;   INR: 2.10 ratio                 RADIOLOGY & ADDITIONAL STUDIES:  < from: CT Abdomen and Pelvis w/ Oral Cont and w/ IV Cont (01.18.18 @ 10:53) >  CT of the Chest, Abdomen and Pelvis was performed with intravenous   contrast.   Intravenous contrast: 85 ml Omnipaque 350. 15 ml discarded.  Oral contrast: positive contrast was administered.  Sagittal and coronal reformats were performed.    FINDINGS:    CHEST:     LUNGS AND LARGE AIRWAYS: Patent central airways. Dependent and basilar   atelectasis. Otherwise clear lungs.  PLEURA: Trace bilateral pleural effusions.  VESSELS: Atherosclerotic changes of thoracic aorta and coronary arteries.  HEART: Enlarged. Aortic valve prosthesis. Trace pericardial effusion.  MEDIASTINUM AND DEVIN: No lymphadenopathy.  CHEST WALL AND LOWER NECK: Left breast mass measuring 3.1 x 1.5 x 2 cm.   Multiple thyroid lesions measuring up to 1.8 cm.    ABDOMEN AND PELVIS:    LIVER: Within normal limits.  BILE DUCTS: Normal caliber.  GALLBLADDER: Within normal limits.  SPLEEN: Within normal limits.  PANCREAS: Mildly atrophic. Cystic lesions measuring 1.2 cm in pancreatic   head and 0.8 cm in pancreatic body.  ADRENALS: 3.5 cm heterogeneously enhancing left adrenal mass (82   Hounsfield units).  KIDNEYS/URETERS: 3.4 cm left midpole renal mass. Renal cysts measuring up   to 3.5 cm. Nonspecific perinephric stranding.    BLADDER: Within normal limits.  REPRODUCTIVE ORGANS: Status post hysterectomy.    BOWEL: No bowel obstruction. Appendix within normal limits. Colonic   diverticulosis.  PERITONEUM: No ascites.  VESSELS:  Atherosclerotic changes.  RETROPERITONEUM: Several prominent retroperitoneal lymph nodes.    ABDOMINAL WALL: Left anterolateral hip subcutaneous fluid collection   measuring approximately 4.9 x 8 x 8.1 cm.  BONES: Status post right hip arthroplasty. Spine and left hip   degenerative changes. Compression de    < end of copied text >      PATHOLOGY:

## 2018-02-08 NOTE — DISCHARGE NOTE ADULT - SECONDARY DIAGNOSIS.
Chronic atrial fibrillation Mild intermittent asthma without complication Metastatic cancer Mixed hyperlipidemia Morbid obesity due to excess calories Primary hypertension

## 2018-02-08 NOTE — PROGRESS NOTE ADULT - SUBJECTIVE AND OBJECTIVE BOX
Subjective: anxious      MEDICATIONS  (STANDING):  aspirin enteric coated 81 milliGRAM(s) Oral daily  atorvastatin 10 milliGRAM(s) Oral at bedtime  buDESOnide 160 MICROgram(s)/formoterol 4.5 MICROgram(s) Inhaler 2 Puff(s) Inhalation two times a day  diltiazem    milliGRAM(s) Oral daily  losartan 100 milliGRAM(s) Oral daily  metoprolol succinate  milliGRAM(s) Oral daily  pantoprazole    Tablet 40 milliGRAM(s) Oral before breakfast  petrolatum white Ointment 1 Application(s) Topical two times a day  sertraline 75 milliGRAM(s) Oral daily  sodium chloride 0.9%. 1000 milliLiter(s) (100 mL/Hr) IV Continuous <Continuous>    MEDICATIONS  (PRN):  ALBUTerol    90 MICROgram(s) HFA Inhaler 2 Puff(s) Inhalation every 6 hours PRN Shortness of Breath and/or Wheezing  oxyCODONE    5 mG/acetaminophen 325 mG 1 Tablet(s) Oral every 6 hours PRN Severe Pain (7 - 10)          T(C): 37.2 (02-08-18 @ 05:23), Max: 37.2 (02-08-18 @ 05:23)  HR: 86 (02-08-18 @ 05:23) (62 - 86)  BP: 154/74 (02-08-18 @ 05:23) (142/67 - 154/74)  RR: 17 (02-08-18 @ 05:23) (17 - 18)  SpO2: 96% (02-08-18 @ 05:23) (96% - 99%)  Wt(kg): --        I&O's Detail    07 Feb 2018 07:01  -  08 Feb 2018 07:00  --------------------------------------------------------  IN:    Oral Fluid: 460 mL    sodium chloride 0.9%.: 1200 mL  Total IN: 1660 mL    OUT:  Total OUT: 0 mL    Total NET: 1660 mL               PHYSICAL EXAM:    GENERAL: mildly anxious  EYES: EOMI, PERRLA, conjunctiva and sclera clear  NECK: Supple, no inc in JVP  CHEST/LUNG: Clear  HEART: S1S2  ABDOMEN: Soft, Nontender, Nondistended; Bowel sounds present  EXTREMITIES:  stasis dermatosis, trace edema  NEURO: no asterixis      LABS:  CBC Full  -  ( 07 Feb 2018 08:09 )  WBC Count : 8.55 K/uL  Hemoglobin : 11.5 g/dL  Hematocrit : 36.3 %  Platelet Count - Automated : 216 K/uL  Mean Cell Volume : 93.1 fl  Mean Cell Hemoglobin : 29.5 pg  Mean Cell Hemoglobin Concentration : 31.7 gm/dL  Auto Neutrophil # : x  Auto Lymphocyte # : x  Auto Monocyte # : x  Auto Eosinophil # : x  Auto Basophil # : x  Auto Neutrophil % : x  Auto Lymphocyte % : x  Auto Monocyte % : x  Auto Eosinophil % : x  Auto Basophil % : x    02-08    141  |  100  |  11  ----------------------------<  98  3.3<L>   |  40<H>  |  0.49<L>    Ca    10.5<H>      08 Feb 2018 07:25  Phos  2.2     02-08    TPro  6.8  /  Alb  2.5<L>  /  TBili  0.4  /  DBili  x   /  AST  11<L>  /  ALT  15  /  AlkPhos  73  02-07    PT/INR - ( 08 Feb 2018 07:25 )   PT: 23.2 sec;   INR: 2.10 ratio             Impression:  * Paraneoplastic hyperCa. Improving post Pamidronate.   * Metastatic ? breast CA  * Renal, adrenal mass  * Undersuppressed PTH  Recommendations:  * Dec IVFs to 50cc/h  * Supplement K  * If remains hospitalized, repeat BMP in am  * No objection to dc

## 2018-02-08 NOTE — DISCHARGE NOTE ADULT - MEDICATION SUMMARY - MEDICATIONS TO STOP TAKING
I will STOP taking the medications listed below when I get home from the hospital:    Calcium 600+D 600 mg-200 units oral tablet  --  by mouth once a day    furosemide 40 mg oral tablet  -- 1 tab(s) by mouth 2 times a day    Multiple Vitamins oral tablet  -- 1 tab(s) by mouth once a day

## 2018-02-08 NOTE — DISCHARGE NOTE ADULT - CARE PLAN
Principal Discharge DX:	Hypercalcemia  Goal:	Resolving  Assessment and plan of treatment:	Avoid calcium supplements  Follow up with oncologist and PMD  Secondary Diagnosis:	Chronic atrial fibrillation  Assessment and plan of treatment:	Continue Coumadin  Secondary Diagnosis:	Mild intermittent asthma without complication  Assessment and plan of treatment:	Continue home meds  Secondary Diagnosis:	Metastatic cancer  Assessment and plan of treatment:	Avoid calcium supplements  Follow up with oncologist and PMD  Secondary Diagnosis:	Mixed hyperlipidemia  Assessment and plan of treatment:	Continue home statin medication  Follow up with PCP  Heart healthy diet - [email or web address]  Secondary Diagnosis:	Morbid obesity due to excess calories  Assessment and plan of treatment:	Low calorie diet and exercise  Secondary Diagnosis:	Primary hypertension  Assessment and plan of treatment:	Continue home blood pressure medications  Follow up with PCP  Low-sodium diet  AHA Recipes - [email or web address]

## 2018-02-08 NOTE — DIETITIAN INITIAL EVALUATION ADULT. - PHYSICAL APPEARANCE
obese/BMI = 35.9, Edema -1+ (L & R) Foot. Nutrition focused physical exam conducted ;   Subcutaneous fat loss: [wdl ] Orbital fat pads region, [wdl ]Buccal fat region, [wdl ]Triceps region,  [wdl ]Ribs region.  Muscle wasting: [wdl ]Temples region, [wdl ]Clavicle region, [wdl ]Shoulder region, [unable ]Scapula region, [wdl ]Interosseous region,  [unable ]thigh region, [unable ]Calf region

## 2018-02-08 NOTE — DIETITIAN INITIAL EVALUATION ADULT. - NS AS NUTRI INTERV ED CONTENT3
High calorie high protein nutrition therapy & recipes left at bedside/Survival information/Purpose of the nutrition education

## 2018-02-08 NOTE — DIETITIAN INITIAL EVALUATION ADULT. - OTHER INFO
Pt seen today due to MD referral for assessment. Pt admitted from [facility] rehab with recently fx R arm and soft cast. Food preferences obtained from daughter in law.. PTA pt had resided at home, had done the food shopping/cooking. Denies food allergies. Had been taking coumadin and aware of foods to limit.. Not eating well lately, not feeling well enough to eat. Unsure of usual body weight, but family reports poor intake/decreased and they have been bringing foods from home.

## 2018-02-08 NOTE — DISCHARGE NOTE ADULT - HOSPITAL COURSE
85 year old woman with PMH of Breast Ca with osteolytic bone mets, HTN, HLD and Afib on coumadin sent from [facility] for hypercalcemia.  Pt offers no complaints and was sent over by PMD.  Recently fractured right arm.  Admission done on behalf of PMD.      In the ED, Ca found to be 12.3, rest of BMP consistent with dehydration.  Ionized Ca pending.  Started on IVF and given 1 dose IV pamidronate in ED.    Pt stable for discharge home w/ home care and follow up with oncologist and PMD as outpatient. Avoid calcium supplementation. 85 year old woman with PMH of Breast Ca with osteolytic bone mets, HTN, HLD and Afib on coumadin sent from [facility] for hypercalcemia.  Pt offers no complaints and was sent over by PMD.  Recently fractured right arm.  Admission done on behalf of PMD.  Dx c breast R kidney tumor, multiple thyroid tumors, possible pancreatic Mass    In the ED, Ca found to be 12.3, rest of BMP consistent with dehydration.  Ionized Ca pending.  Started on IVF and given 1 dose IV pamidronate in ED.    Pt stable for discharge home w/ home care and follow up with oncologist and PMD as outpatient. Avoid calcium supplementation.

## 2018-02-08 NOTE — DISCHARGE NOTE ADULT - PLAN OF CARE
Resolving Avoid calcium supplements  Follow up with oncologist and PMD Continue Coumadin Continue home meds Continue home statin medication  Follow up with PCP  Heart healthy diet - [email or web address] Low calorie diet and exercise Continue home blood pressure medications  Follow up with PCP  Low-sodium diet  AHA Recipes - [email or web address]

## 2018-02-08 NOTE — DISCHARGE NOTE ADULT - CARE PROVIDER_API CALL
Jeremy Neumann (), Family Medicine  [address]  [address]  [place] [address]  Phone: [phone]  Fax: [phone]    Asad Munoz (MD), Medical Oncology  [address]  [address]  [place] [address]  Phone: [phone]  Fax: [phone]    Gaurav Lentz), Internal Medicine; Nephrology  [address] [facility]  [address] [place] [id number]  Phone: [phone]  Fax: [phone]

## 2018-02-09 LAB
ANION GAP SERPL CALC-SCNC: 2 MMOL/L — LOW (ref 5–17)
BUN SERPL-MCNC: 10 MG/DL — SIGNIFICANT CHANGE UP (ref 7–23)
CALCIUM SERPL-MCNC: 9.5 MG/DL — SIGNIFICANT CHANGE UP (ref 8.5–10.1)
CHLORIDE SERPL-SCNC: 100 MMOL/L — SIGNIFICANT CHANGE UP (ref 96–108)
CO2 SERPL-SCNC: 39 MMOL/L — HIGH (ref 22–31)
CREAT SERPL-MCNC: 0.36 MG/DL — LOW (ref 0.5–1.3)
GLUCOSE SERPL-MCNC: 113 MG/DL — HIGH (ref 70–99)
HCT VFR BLD CALC: 33.1 % — LOW (ref 34.5–45)
HGB BLD-MCNC: 10.6 G/DL — LOW (ref 11.5–15.5)
INR BLD: 2.96 RATIO — HIGH (ref 0.88–1.16)
MCHC RBC-ENTMCNC: 29.7 PG — SIGNIFICANT CHANGE UP (ref 27–34)
MCHC RBC-ENTMCNC: 32 GM/DL — SIGNIFICANT CHANGE UP (ref 32–36)
MCV RBC AUTO: 92.7 FL — SIGNIFICANT CHANGE UP (ref 80–100)
NRBC # BLD: 0 /100 WBCS — SIGNIFICANT CHANGE UP (ref 0–0)
PLATELET # BLD AUTO: 175 K/UL — SIGNIFICANT CHANGE UP (ref 150–400)
POTASSIUM SERPL-MCNC: 3.4 MMOL/L — LOW (ref 3.5–5.3)
POTASSIUM SERPL-SCNC: 3.4 MMOL/L — LOW (ref 3.5–5.3)
PROTHROM AB SERPL-ACNC: 33 SEC — HIGH (ref 9.8–12.7)
RBC # BLD: 3.57 M/UL — LOW (ref 3.8–5.2)
RBC # FLD: 13.6 % — SIGNIFICANT CHANGE UP (ref 10.3–14.5)
SODIUM SERPL-SCNC: 141 MMOL/L — SIGNIFICANT CHANGE UP (ref 135–145)
WBC # BLD: 5.92 K/UL — SIGNIFICANT CHANGE UP (ref 3.8–10.5)
WBC # FLD AUTO: 5.92 K/UL — SIGNIFICANT CHANGE UP (ref 3.8–10.5)

## 2018-02-09 RX ORDER — IPRATROPIUM/ALBUTEROL SULFATE 18-103MCG
3 AEROSOL WITH ADAPTER (GRAM) INHALATION EVERY 6 HOURS
Qty: 0 | Refills: 0 | Status: DISCONTINUED | OUTPATIENT
Start: 2018-02-09 | End: 2018-02-10

## 2018-02-09 RX ORDER — IPRATROPIUM/ALBUTEROL SULFATE 18-103MCG
3 AEROSOL WITH ADAPTER (GRAM) INHALATION ONCE
Qty: 0 | Refills: 0 | Status: COMPLETED | OUTPATIENT
Start: 2018-02-09 | End: 2018-02-09

## 2018-02-09 RX ORDER — ALBUTEROL 90 UG/1
2 AEROSOL, METERED ORAL
Qty: 180 | Refills: 0
Start: 2018-02-09 | End: 2018-03-10

## 2018-02-09 RX ORDER — WARFARIN SODIUM 2.5 MG/1
2.5 TABLET ORAL ONCE
Qty: 0 | Refills: 0 | Status: COMPLETED | OUTPATIENT
Start: 2018-02-09 | End: 2018-02-09

## 2018-02-09 RX ORDER — POTASSIUM CHLORIDE 20 MEQ
40 PACKET (EA) ORAL ONCE
Qty: 0 | Refills: 0 | Status: COMPLETED | OUTPATIENT
Start: 2018-02-09 | End: 2018-02-10

## 2018-02-09 RX ADMIN — SERTRALINE 75 MILLIGRAM(S): 25 TABLET, FILM COATED ORAL at 22:08

## 2018-02-09 RX ADMIN — BUDESONIDE AND FORMOTEROL FUMARATE DIHYDRATE 2 PUFF(S): 160; 4.5 AEROSOL RESPIRATORY (INHALATION) at 05:46

## 2018-02-09 RX ADMIN — Medication 3 MILLILITER(S): at 17:57

## 2018-02-09 RX ADMIN — Medication 1 APPLICATION(S): at 05:47

## 2018-02-09 RX ADMIN — ATORVASTATIN CALCIUM 10 MILLIGRAM(S): 80 TABLET, FILM COATED ORAL at 22:08

## 2018-02-09 RX ADMIN — Medication 1 APPLICATION(S): at 18:08

## 2018-02-09 RX ADMIN — Medication 120 MILLIGRAM(S): at 05:46

## 2018-02-09 RX ADMIN — Medication 81 MILLIGRAM(S): at 11:58

## 2018-02-09 RX ADMIN — WARFARIN SODIUM 2.5 MILLIGRAM(S): 2.5 TABLET ORAL at 22:08

## 2018-02-09 RX ADMIN — BUDESONIDE AND FORMOTEROL FUMARATE DIHYDRATE 2 PUFF(S): 160; 4.5 AEROSOL RESPIRATORY (INHALATION) at 18:08

## 2018-02-09 RX ADMIN — Medication 100 MILLIGRAM(S): at 05:46

## 2018-02-09 RX ADMIN — PANTOPRAZOLE SODIUM 40 MILLIGRAM(S): 20 TABLET, DELAYED RELEASE ORAL at 07:50

## 2018-02-09 RX ADMIN — LOSARTAN POTASSIUM 100 MILLIGRAM(S): 100 TABLET, FILM COATED ORAL at 05:46

## 2018-02-09 NOTE — PROGRESS NOTE ADULT - SUBJECTIVE AND OBJECTIVE BOX
Patient feels well no complaints today.    MEDICATIONS  (STANDING):  aspirin enteric coated 81 milliGRAM(s) Oral daily  atorvastatin 10 milliGRAM(s) Oral at bedtime  buDESOnide 160 MICROgram(s)/formoterol 4.5 MICROgram(s) Inhaler 2 Puff(s) Inhalation two times a day  diltiazem    milliGRAM(s) Oral daily  losartan 100 milliGRAM(s) Oral daily  metoprolol succinate  milliGRAM(s) Oral daily  pantoprazole    Tablet 40 milliGRAM(s) Oral before breakfast  petrolatum white Ointment 1 Application(s) Topical two times a day  potassium chloride    Tablet ER 40 milliEquivalent(s) Oral once  sertraline 75 milliGRAM(s) Oral daily    MEDICATIONS  (PRN):  ALBUTerol    90 MICROgram(s) HFA Inhaler 2 Puff(s) Inhalation every 6 hours PRN Shortness of Breath and/or Wheezing  oxyCODONE    5 mG/acetaminophen 325 mG 1 Tablet(s) Oral every 6 hours PRN Severe Pain (7 - 10)      PHYSICAL EXAM:      T(C): 37 (02-09-18 @ 06:29), Max: 37.1 (02-08-18 @ 17:04)  HR: 86 (02-09-18 @ 09:30) (82 - 90)  BP: 150/70 (02-09-18 @ 09:30) (149/73 - 159/63)  RR: 18 (02-09-18 @ 09:30) (18 - 20)  SpO2: 99% (02-09-18 @ 09:30) (85% - 99%)  Wt(kg): --  Respiratory: clear anteriorly, decreased BS at bases  Cardiovascular: S1 S2  Gastrointestinal: soft NT ND +BS  Extremities:  1 edema                                    10.6   5.92  )-----------( 175      ( 09 Feb 2018 08:15 )             33.1     02-09    141  |  100  |  10  ----------------------------<  113<H>  3.4<L>   |  39<H>  |  0.36<L>    Ca    9.5      09 Feb 2018 08:15  Phos  2.2     02-08            Assessment and Plan:  Hypercalcemia, hypophosphatemia; hyperparathyroidism suspected with underlying bone mets.  Replete phos and K   Clinically stable.

## 2018-02-09 NOTE — PROGRESS NOTE ADULT - SUBJECTIVE AND OBJECTIVE BOX
INTERVAL History:  weak  leg swelling.    Allergies    menthol topical (Unknown)  sulfa (Unknown)    Intolerances        MEDICATIONS  (STANDING):  aspirin enteric coated 81 milliGRAM(s) Oral daily  atorvastatin 10 milliGRAM(s) Oral at bedtime  buDESOnide 160 MICROgram(s)/formoterol 4.5 MICROgram(s) Inhaler 2 Puff(s) Inhalation two times a day  diltiazem    milliGRAM(s) Oral daily  losartan 100 milliGRAM(s) Oral daily  metoprolol succinate  milliGRAM(s) Oral daily  pantoprazole    Tablet 40 milliGRAM(s) Oral before breakfast  petrolatum white Ointment 1 Application(s) Topical two times a day  potassium chloride    Tablet ER 40 milliEquivalent(s) Oral once  sertraline 75 milliGRAM(s) Oral daily    MEDICATIONS  (PRN):  ALBUTerol    90 MICROgram(s) HFA Inhaler 2 Puff(s) Inhalation every 6 hours PRN Shortness of Breath and/or Wheezing  oxyCODONE    5 mG/acetaminophen 325 mG 1 Tablet(s) Oral every 6 hours PRN Severe Pain (7 - 10)      Vital Signs Last 24 Hrs  T(C): 37 (09 Feb 2018 06:29), Max: 37.1 (08 Feb 2018 17:04)  T(F): 98.6 (09 Feb 2018 06:29), Max: 98.8 (08 Feb 2018 17:04)  HR: 86 (09 Feb 2018 09:30) (82 - 90)  BP: 150/70 (09 Feb 2018 09:30) (149/73 - 159/63)  BP(mean): --  RR: 18 (09 Feb 2018 09:30) (18 - 20)  SpO2: 99% (09 Feb 2018 09:30) (85% - 99%)    PHYSICAL EXAM:  Left Breast Mass.    EYES: EOMI, PERRLA, conjunctiva and sclera clear  NECK: Supple, No JVD, Normal thyroid  CHEST/LUNG: Clear to percussion bilaterally; No rales, rhonchi,   HEART: Regular rate and rhythm;   ABDOMEN: Soft, Nontender.          LABS:                        10.6   5.92  )-----------( 175      ( 09 Feb 2018 08:15 )             33.1     02-09    141  |  100  |  10  ----------------------------<  113<H>  3.4<L>   |  39<H>  |  0.36<L>    Ca    9.5      09 Feb 2018 08:15  Phos  2.2     02-08      PT/INR - ( 09 Feb 2018 08:15 )   PT: 33.0 sec;   INR: 2.96 ratio                 RADIOLOGY & ADDITIONAL STUDIES:    PATHOLOGY:

## 2018-02-10 VITALS — TEMPERATURE: 98 F

## 2018-02-10 LAB
INR BLD: 2.13 RATIO — HIGH (ref 0.88–1.16)
PROTHROM AB SERPL-ACNC: 23.6 SEC — HIGH (ref 9.8–12.7)

## 2018-02-10 RX ADMIN — LOSARTAN POTASSIUM 100 MILLIGRAM(S): 100 TABLET, FILM COATED ORAL at 05:08

## 2018-02-10 RX ADMIN — Medication 1 APPLICATION(S): at 05:09

## 2018-02-10 RX ADMIN — Medication 100 MILLIGRAM(S): at 05:08

## 2018-02-10 RX ADMIN — Medication 40 MILLIEQUIVALENT(S): at 01:09

## 2018-02-10 RX ADMIN — PANTOPRAZOLE SODIUM 40 MILLIGRAM(S): 20 TABLET, DELAYED RELEASE ORAL at 07:26

## 2018-02-10 RX ADMIN — BUDESONIDE AND FORMOTEROL FUMARATE DIHYDRATE 2 PUFF(S): 160; 4.5 AEROSOL RESPIRATORY (INHALATION) at 05:09

## 2018-02-10 RX ADMIN — Medication 120 MILLIGRAM(S): at 05:08

## 2018-02-10 NOTE — PROGRESS NOTE ADULT - SUBJECTIVE AND OBJECTIVE BOX
INTERVAL History:  Left Breast Tum9or with Metastases.  Came with High Calcium.    Allergies    menthol topical (Unknown)  sulfa (Unknown)    Intolerances        MEDICATIONS  (STANDING):  aspirin enteric coated 81 milliGRAM(s) Oral daily  atorvastatin 10 milliGRAM(s) Oral at bedtime  buDESOnide 160 MICROgram(s)/formoterol 4.5 MICROgram(s) Inhaler 2 Puff(s) Inhalation two times a day  diltiazem    milliGRAM(s) Oral daily  losartan 100 milliGRAM(s) Oral daily  metoprolol succinate  milliGRAM(s) Oral daily  pantoprazole    Tablet 40 milliGRAM(s) Oral before breakfast  petrolatum white Ointment 1 Application(s) Topical two times a day  sertraline 75 milliGRAM(s) Oral daily    MEDICATIONS  (PRN):  ALBUTerol/ipratropium for Nebulization 3 milliLiter(s) Nebulizer every 6 hours PRN Shortness of Breath and/or Wheezing  oxyCODONE    5 mG/acetaminophen 325 mG 1 Tablet(s) Oral every 6 hours PRN Severe Pain (7 - 10)      Vital Signs Last 24 Hrs  T(C): 36.4 (10 Feb 2018 05:16), Max: 36.8 (10 Feb 2018 00:50)  T(F): 97.6 (10 Feb 2018 05:16), Max: 98.3 (10 Feb 2018 00:50)  HR: 88 (10 Feb 2018 05:16) (60 - 88)  BP: 155/96 (10 Feb 2018 05:16) (144/97 - 155/96)  BP(mean): --  RR: 18 (10 Feb 2018 05:16) (18 - 24)  SpO2: 98% (10 Feb 2018 05:16) (97% - 99%)    PHYSICAL EXAM:      EYES: EOMI, PERRLA, conjunctiva and sclera clear  NECK: Supple, No JVD, Normal thyroid  CHEST/LUNG: Clear to percussion bilaterally; No rales, rhonchi,     ABDOMEN: Soft,  Left Breast Mass 4x4 cm      LABS:                        10.6   5.92  )-----------( 175      ( 09 Feb 2018 08:15 )             33.1     02-09    141  |  100  |  10  ----------------------------<  113<H>  3.4<L>   |  39<H>  |  0.36<L>    Ca    9.5      09 Feb 2018 08:15      PT/INR - ( 10 Feb 2018 06:56 )   PT: 23.6 sec;   INR: 2.13 ratio                 RADIOLOGY & ADDITIONAL STUDIES:    PATHOLOGY:

## 2018-02-10 NOTE — PROGRESS NOTE ADULT - SUBJECTIVE AND OBJECTIVE BOX
Subjective: awake, no dyspnea      MEDICATIONS  (STANDING):  aspirin enteric coated 81 milliGRAM(s) Oral daily  atorvastatin 10 milliGRAM(s) Oral at bedtime  buDESOnide 160 MICROgram(s)/formoterol 4.5 MICROgram(s) Inhaler 2 Puff(s) Inhalation two times a day  diltiazem    milliGRAM(s) Oral daily  losartan 100 milliGRAM(s) Oral daily  metoprolol succinate  milliGRAM(s) Oral daily  pantoprazole    Tablet 40 milliGRAM(s) Oral before breakfast  petrolatum white Ointment 1 Application(s) Topical two times a day  sertraline 75 milliGRAM(s) Oral daily    MEDICATIONS  (PRN):  ALBUTerol/ipratropium for Nebulization 3 milliLiter(s) Nebulizer every 6 hours PRN Shortness of Breath and/or Wheezing  oxyCODONE    5 mG/acetaminophen 325 mG 1 Tablet(s) Oral every 6 hours PRN Severe Pain (7 - 10)          T(C): 36.4 (02-10-18 @ 05:16), Max: 36.8 (02-10-18 @ 00:50)  HR: 88 (02-10-18 @ 05:16) (60 - 88)  BP: 155/96 (02-10-18 @ 05:16) (144/97 - 155/96)  RR: 18 (02-10-18 @ 05:16) (18 - 24)  SpO2: 98% (02-10-18 @ 05:16) (97% - 99%)  Wt(kg): --        I&O's Detail    09 Feb 2018 07:01  -  10 Feb 2018 07:00  --------------------------------------------------------  IN:    Oral Fluid: 100 mL  Total IN: 100 mL    OUT:    Voided: 600 mL  Total OUT: 600 mL    Total NET: -500 mL               PHYSICAL EXAM:    GENERAL: mildly anxious  EYES: EOMI, PERRLA, conjunctiva and sclera clear  NECK: Supple, no inc in JVP  CHEST/LUNG: Clear  HEART: S1S2  ABDOMEN: Soft, Nontender, Nondistended; Bowel sounds present  EXTREMITIES:  stasis dermatosis, trace edema  NEURO: no asterixis        LABS:  CBC Full  -  ( 09 Feb 2018 08:15 )  WBC Count : 5.92 K/uL  Hemoglobin : 10.6 g/dL  Hematocrit : 33.1 %  Platelet Count - Automated : 175 K/uL  Mean Cell Volume : 92.7 fl  Mean Cell Hemoglobin : 29.7 pg  Mean Cell Hemoglobin Concentration : 32.0 gm/dL  Auto Neutrophil # : x  Auto Lymphocyte # : x  Auto Monocyte # : x  Auto Eosinophil # : x  Auto Basophil # : x  Auto Neutrophil % : x  Auto Lymphocyte % : x  Auto Monocyte % : x  Auto Eosinophil % : x  Auto Basophil % : x    02-09    141  |  100  |  10  ----------------------------<  113<H>  3.4<L>   |  39<H>  |  0.36<L>    Ca    9.5      09 Feb 2018 08:15      PT/INR - ( 10 Feb 2018 06:56 )   PT: 23.6 sec;   INR: 2.13 ratio           Impression:  * 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 breast CA  * Renal, adrenal mass  * Undersuppressed PTH  Recommendations:  * D/c IVF  * Supplement K PRN  * If remains hospitalized, repeat BMP in am  * No objection to dc

## 2018-02-11 RX ORDER — DILTIAZEM HCL 120 MG
1 CAPSULE, EXT RELEASE 24 HR ORAL
Qty: 30 | Refills: 0
Start: 2018-02-11 | End: 2018-03-12

## 2018-02-11 RX ORDER — DILTIAZEM HCL 120 MG
2 CAPSULE, EXT RELEASE 24 HR ORAL
Qty: 0 | Refills: 0 | DISCHARGE
Start: 2018-02-11 | End: 2018-03-12

## 2018-02-11 RX ORDER — METOPROLOL TARTRATE 50 MG
1 TABLET ORAL
Qty: 30 | Refills: 0
Start: 2018-02-11 | End: 2018-03-12

## 2018-02-11 RX ORDER — LOSARTAN POTASSIUM 100 MG/1
1 TABLET, FILM COATED ORAL
Qty: 30 | Refills: 0
Start: 2018-02-11 | End: 2018-03-12

## 2018-02-11 RX ORDER — LOSARTAN POTASSIUM 100 MG/1
0.5 TABLET, FILM COATED ORAL
Qty: 0 | Refills: 0 | DISCHARGE
Start: 2018-02-11 | End: 2018-03-12

## 2018-02-12 LAB — SURGICAL PATHOLOGY FINAL REPORT - CH: SIGNIFICANT CHANGE UP

## 2018-02-27 ENCOUNTER — APPOINTMENT (OUTPATIENT)
Dept: ORTHOPEDIC SURGERY | Facility: CLINIC | Age: 83
End: 2018-02-27
Payer: MEDICARE

## 2018-02-27 VITALS — WEIGHT: 240 LBS | HEIGHT: 62 IN | BODY MASS INDEX: 44.16 KG/M2

## 2018-02-27 DIAGNOSIS — C79.9 SECONDARY MALIGNANT NEOPLASM OF UNSPECIFIED SITE: ICD-10-CM

## 2018-02-27 PROCEDURE — 73090 X-RAY EXAM OF FOREARM: CPT | Mod: RT

## 2018-02-27 PROCEDURE — 99204 OFFICE O/P NEW MOD 45 MIN: CPT

## 2018-04-24 ENCOUNTER — APPOINTMENT (OUTPATIENT)
Dept: ORTHOPEDIC SURGERY | Facility: CLINIC | Age: 83
End: 2018-04-24

## 2018-04-27 NOTE — INPATIENT CERTIFICATION FOR MEDICARE PATIENTS - IN ORDER TO MEET MEDICARE REQUIREMENTS.
In order to meet Medicare requirements, the clinical documentation must support the information cited in the admission order.  Please be sure to provide detailed and clear documentation about the following in the admitting note/history and physical: ,DirectAddress_Unknown

## 2020-02-06 NOTE — DISCHARGE NOTE ADULT - NSFTFSERV2RD_GEN_ALL_CORE
Pt advised. If RUQ pain persists or worsens, will check CMP and RUQ ultrasound. Doubt PIH at this time.   Physical Therapy

## 2020-06-04 NOTE — ED ADULT NURSE NOTE - NS ED NURSE LEVEL OF CONSCIOUSNESS AFFECT
Calm What Type Of Note Output Would You Prefer (Optional)?: Bullet Format Have Your Spot(S) Been Treated In The Past?: has not been treated Hpi Title: Evaluation of Skin Lesions

## 2020-07-02 ENCOUNTER — INPATIENT (INPATIENT)
Facility: HOSPITAL | Age: 85
LOS: 4 days | Discharge: HOME | DRG: 291 | End: 2020-07-07
Attending: INTERNAL MEDICINE | Admitting: INTERNAL MEDICINE
Payer: MEDICARE

## 2020-07-02 VITALS
TEMPERATURE: 100 F | OXYGEN SATURATION: 93 % | DIASTOLIC BLOOD PRESSURE: 66 MMHG | HEART RATE: 100 BPM | WEIGHT: 199.96 LBS | HEIGHT: 63 IN | SYSTOLIC BLOOD PRESSURE: 94 MMHG | RESPIRATION RATE: 20 BRPM

## 2020-07-02 DIAGNOSIS — Z98.42 CATARACT EXTRACTION STATUS, LEFT EYE: Chronic | ICD-10-CM

## 2020-07-02 DIAGNOSIS — R09.89 OTHER SPECIFIED SYMPTOMS AND SIGNS INVOLVING THE CIRCULATORY AND RESPIRATORY SYSTEMS: ICD-10-CM

## 2020-07-02 DIAGNOSIS — R09.02 HYPOXEMIA: ICD-10-CM

## 2020-07-02 LAB
ALBUMIN SERPL ELPH-MCNC: 4.1 G/DL — SIGNIFICANT CHANGE UP (ref 3.3–5)
ALP SERPL-CCNC: 58 U/L — SIGNIFICANT CHANGE UP (ref 40–120)
ALT FLD-CCNC: 9 U/L — LOW (ref 10–45)
ANION GAP SERPL CALC-SCNC: 13 MMOL/L — SIGNIFICANT CHANGE UP (ref 5–17)
APTT BLD: 40.1 SEC — HIGH (ref 27.5–35.5)
AST SERPL-CCNC: 16 U/L — SIGNIFICANT CHANGE UP (ref 10–40)
BASOPHILS # BLD AUTO: 0.02 K/UL — SIGNIFICANT CHANGE UP (ref 0–0.2)
BASOPHILS NFR BLD AUTO: 0.2 % — SIGNIFICANT CHANGE UP (ref 0–2)
BILIRUB SERPL-MCNC: 0.6 MG/DL — SIGNIFICANT CHANGE UP (ref 0.2–1.2)
BUN SERPL-MCNC: 37 MG/DL — HIGH (ref 7–23)
CALCIUM SERPL-MCNC: 10.1 MG/DL — SIGNIFICANT CHANGE UP (ref 8.4–10.5)
CHLORIDE SERPL-SCNC: 96 MMOL/L — SIGNIFICANT CHANGE UP (ref 96–108)
CO2 SERPL-SCNC: 30 MMOL/L — SIGNIFICANT CHANGE UP (ref 22–31)
CREAT SERPL-MCNC: 1.34 MG/DL — HIGH (ref 0.5–1.3)
EOSINOPHIL # BLD AUTO: 0 K/UL — SIGNIFICANT CHANGE UP (ref 0–0.5)
EOSINOPHIL NFR BLD AUTO: 0 % — SIGNIFICANT CHANGE UP (ref 0–6)
GLUCOSE SERPL-MCNC: 127 MG/DL — HIGH (ref 70–99)
HCT VFR BLD CALC: 40.4 % — SIGNIFICANT CHANGE UP (ref 34.5–45)
HGB BLD-MCNC: 13.3 G/DL — SIGNIFICANT CHANGE UP (ref 11.5–15.5)
IMM GRANULOCYTES NFR BLD AUTO: 0.6 % — SIGNIFICANT CHANGE UP (ref 0–1.5)
INR BLD: 4.34 RATIO — HIGH (ref 0.88–1.16)
LYMPHOCYTES # BLD AUTO: 0.43 K/UL — LOW (ref 1–3.3)
LYMPHOCYTES # BLD AUTO: 4.6 % — LOW (ref 13–44)
MCHC RBC-ENTMCNC: 31.7 PG — SIGNIFICANT CHANGE UP (ref 27–34)
MCHC RBC-ENTMCNC: 32.9 GM/DL — SIGNIFICANT CHANGE UP (ref 32–36)
MCV RBC AUTO: 96.2 FL — SIGNIFICANT CHANGE UP (ref 80–100)
MONOCYTES # BLD AUTO: 0.59 K/UL — SIGNIFICANT CHANGE UP (ref 0–0.9)
MONOCYTES NFR BLD AUTO: 6.3 % — SIGNIFICANT CHANGE UP (ref 2–14)
NEUTROPHILS # BLD AUTO: 8.3 K/UL — HIGH (ref 1.8–7.4)
NEUTROPHILS NFR BLD AUTO: 88.3 % — HIGH (ref 43–77)
NRBC # BLD: 0 /100 WBCS — SIGNIFICANT CHANGE UP (ref 0–0)
NT-PROBNP SERPL-SCNC: 6998 PG/ML — HIGH (ref 0–300)
PLATELET # BLD AUTO: 143 K/UL — LOW (ref 150–400)
POTASSIUM SERPL-MCNC: 4.1 MMOL/L — SIGNIFICANT CHANGE UP (ref 3.5–5.3)
POTASSIUM SERPL-SCNC: 4.1 MMOL/L — SIGNIFICANT CHANGE UP (ref 3.5–5.3)
PROT SERPL-MCNC: 7.1 G/DL — SIGNIFICANT CHANGE UP (ref 6–8.3)
PROTHROM AB SERPL-ACNC: 46.5 SEC — HIGH (ref 10.6–13.6)
RBC # BLD: 4.2 M/UL — SIGNIFICANT CHANGE UP (ref 3.8–5.2)
RBC # FLD: 13.5 % — SIGNIFICANT CHANGE UP (ref 10.3–14.5)
SARS-COV-2 RNA SPEC QL NAA+PROBE: SIGNIFICANT CHANGE UP
SODIUM SERPL-SCNC: 139 MMOL/L — SIGNIFICANT CHANGE UP (ref 135–145)
TROPONIN T, HIGH SENSITIVITY RESULT: 19 NG/L — SIGNIFICANT CHANGE UP (ref 0–51)
TROPONIN T, HIGH SENSITIVITY RESULT: 21 NG/L — SIGNIFICANT CHANGE UP (ref 0–51)
WBC # BLD: 9.4 K/UL — SIGNIFICANT CHANGE UP (ref 3.8–10.5)
WBC # FLD AUTO: 9.4 K/UL — SIGNIFICANT CHANGE UP (ref 3.8–10.5)

## 2020-07-02 PROCEDURE — 99223 1ST HOSP IP/OBS HIGH 75: CPT

## 2020-07-02 PROCEDURE — 99497 ADVNCD CARE PLAN 30 MIN: CPT | Mod: 25

## 2020-07-02 PROCEDURE — 99291 CRITICAL CARE FIRST HOUR: CPT | Mod: CS,GC

## 2020-07-02 PROCEDURE — 71275 CT ANGIOGRAPHY CHEST: CPT | Mod: 26

## 2020-07-02 PROCEDURE — 71045 X-RAY EXAM CHEST 1 VIEW: CPT | Mod: 26

## 2020-07-02 RX ORDER — SODIUM CHLORIDE 9 MG/ML
500 INJECTION INTRAMUSCULAR; INTRAVENOUS; SUBCUTANEOUS ONCE
Refills: 0 | Status: COMPLETED | OUTPATIENT
Start: 2020-07-02 | End: 2020-07-02

## 2020-07-02 RX ORDER — ALBUTEROL 90 UG/1
4 AEROSOL, METERED ORAL ONCE
Refills: 0 | Status: COMPLETED | OUTPATIENT
Start: 2020-07-02 | End: 2020-07-02

## 2020-07-02 RX ORDER — CEFTRIAXONE 500 MG/1
1000 INJECTION, POWDER, FOR SOLUTION INTRAMUSCULAR; INTRAVENOUS ONCE
Refills: 0 | Status: COMPLETED | OUTPATIENT
Start: 2020-07-02 | End: 2020-07-02

## 2020-07-02 RX ORDER — AZITHROMYCIN 500 MG/1
500 TABLET, FILM COATED ORAL ONCE
Refills: 0 | Status: COMPLETED | OUTPATIENT
Start: 2020-07-02 | End: 2020-07-02

## 2020-07-02 RX ADMIN — CEFTRIAXONE 100 MILLIGRAM(S): 500 INJECTION, POWDER, FOR SOLUTION INTRAMUSCULAR; INTRAVENOUS at 16:43

## 2020-07-02 RX ADMIN — AZITHROMYCIN 250 MILLIGRAM(S): 500 TABLET, FILM COATED ORAL at 18:22

## 2020-07-02 RX ADMIN — ALBUTEROL 4 PUFF(S): 90 AEROSOL, METERED ORAL at 15:36

## 2020-07-02 RX ADMIN — SODIUM CHLORIDE 500 MILLILITER(S): 9 INJECTION INTRAMUSCULAR; INTRAVENOUS; SUBCUTANEOUS at 15:36

## 2020-07-02 NOTE — H&P ADULT - PROBLEM SELECTOR PLAN 2
SBP 90's with gen weakness for the past 2-3 days with associated fever and worsening AARON.  PNA and PE r/o'ed with CTA, COVID neg, UA/UCx pending  - s/p 500 cc NS bolus in ED with improvement of BP  - will hold ARB s/p CTA contrast study with RODRIGUEZ

## 2020-07-02 NOTE — H&P ADULT - NSICDXPASTMEDICALHX_GEN_ALL_CORE_FT
PAST MEDICAL HISTORY:  Aortic stenosis moderate    Asthma     Atrial fibrillation     HLD (hyperlipidemia)     HTN (hypertension)     Morbid obesity     Spinal stenosis

## 2020-07-02 NOTE — ED PROVIDER NOTE - ATTENDING CONTRIBUTION TO CARE
I saw and evaluated patient with resident. I discussed H+P and MDM with resident. I agree with the statements made by the resident unless otherwise noted.    The care of this patient was in support of the [facility] countermeasures to Covid-19.

## 2020-07-02 NOTE — ED PROVIDER NOTE - PROGRESS NOTE DETAILS
Katia Jeff, resident MD: pt was signed out to me. presents with worsening AARON with elevated pro-bnp and requiring supplemental O2 with desaturation to high 80s on room air. CTA neg for PE. will admit for further monitoring.

## 2020-07-02 NOTE — H&P ADULT - PROBLEM SELECTOR PLAN 8
with known abd soft tissue mass, increasing in size  - Has appointment to see [facility] on Monday.

## 2020-07-02 NOTE — ED PROVIDER NOTE - OBJECTIVE STATEMENT
87yo F hx aortic stenosis s/p TAVR, afib on coumadin, htn, hld, asthma p/w generalized weakness, worsening AARON, and fever tmax 102 x 3 days. Went to PCP Dr. White and had cxr showing pna and sent to ED for further eval. Of note in triage found to be hypoxic to 88% on RA improved to 98% on 2L NC. Denies cough, cp, abd pain, n/v/d, back pain, leg pain/swelling. 87yo F hx breast CA, aortic stenosis s/p TAVR, afib on coumadin, htn, hld, asthma p/w generalized weakness, worsening AARON, and fever tmax 102 x 3 days. Went to PCP Dr. White and had cxr showing pna and sent to ED for further eval. Of note in triage found to be hypoxic to 88% on RA improved to 98% on 2L NC. Denies cough, cp, abd pain, n/v/d, back pain, leg pain/swelling.

## 2020-07-02 NOTE — ED ADULT NURSE NOTE - OBJECTIVE STATEMENT
Pt is an 87 yo F who was brought to the ED via EMS c/p pna. Pt states she has been having "extreme weakness" for a "few days", a fever x 2 days which is relieved by Tylenol and a non productive cough. States she saw her pmd today, had a CXR and was diagnosed with pna. Sent to the ED for low 02 sats. On arrival in ED, pt A/O x3, resp regular and slightly labored, color good, skin warm, dry, +pulses.

## 2020-07-02 NOTE — H&P ADULT - NSHPATTENDINGPLANDISCUSS_GEN_ALL_CORE
Dr. Crenshaw who requested for initial evaluation/H&P and will assume care of this patient in am of 7/3/20

## 2020-07-02 NOTE — H&P ADULT - PROBLEM SELECTOR PLAN 1
88% RA in ED, currently 98% on 2L, Hx mild intermittent asthma, and baseline AARON with 10 steps, but worsen recently to AARON at rest/minimal exertion  - s/p Ctx and Zithro in ED for suspected PNA with fever and hypoxia, but CXR and CTA neg of PNA, COVID negative x1, no known sick contact, UA/UCx pending but asymptomatic.  Will consider ID in am, and will hold Abx for now  - will supplement O2, Nebs ATC  - will diurese wit Lasix 40 iv bid for now, pBNP 7,000 unclear baseline, hold Torsemide and Spironolactone 88% RA in ED, currently 98% on 2L, Hx mild intermittent asthma, and baseline AARON with 10 steps, but worsen recently to AARON at rest/minimal exertion  - s/p Ctx and Zithro in ED for suspected PNA with fever and hypoxia, but CXR and CTA neg of PNA, COVID negative x1, no known sick contact, UA/UCx/BCx pending but asymptomatic.  Will consider ID in am, and will hold Abx for now  - will supplement O2, Nebs ATC  - will diurese wit Lasix 40 iv bid for now, pBNP 7,000 unclear baseline, hold Torsemide and Spironolactone

## 2020-07-02 NOTE — ED PROVIDER NOTE - NS ED ROS FT
CONSTITUTIONAL: no lightheadedness, no dizziness  EYES: no visual changes, no eye pain  EARS: no ear drainage, no ear pain, no change in hearing  NOSE: no nasal congestion  MOUTH/THROAT: no sore throat  CV: No chest pain, no palpitations  RESP: no cough  GI: No n/v/d, no abd pain  : no dysuria, no hematuria, no flank pain  MSK: no back pain, no extremity pain  SKIN: no rashes  NEURO: no headache, no focal weakness, no decreased sensation/parasthesias   PSYCHIATRIC: no known mental health issues.

## 2020-07-02 NOTE — H&P ADULT - HISTORY OF PRESENT ILLNESS
89yo F hx breast CA, aortic stenosis s/p TAVR, afib on coumadin, htn, hld, asthma p/w generalized weakness, worsening AARON, and fever tmax 102 x 3 days. Went to PCP Dr. White and had cxr showing pna and sent to ED for further eval. Of note in triage found to be hypoxic to 88% on RA improved to 98% on 2L NC. Denies cough, cp, abd pain, n/v/d, back pain, leg pain/swelling 89yo F hx L breast CA on Letrozole, aortic stenosis s/p TAVR, afib on coumadin, htn, hld, asthma p/w generalized weakness, worsening AARON, and fever tmax 102 x 3 days. Patient to see PMD and was told she has PNA, sent to ED for further management.  Patient denies cough, cp, abd pain, n/v/d, back pain, leg pain/swelling, sick/COVID contact.

## 2020-07-02 NOTE — ED PROVIDER NOTE - PHYSICAL EXAMINATION
Gen: Mildly tachypneic @22, no accessory muscle use, NAD  Head: NCAT  HEENT: PERRL, MMM, normal conjunctiva, anicteric, neck supple  Lung: Dull breath sounds L lower lung fields, no adventitious sounds  CV: irregular rhythm, no murmurs  Abd: soft, NTND, no rebound or guarding, no CVAT  MSK: No edema, no visible deformities  Neuro: Moving all extremity grossly  Skin: Warm and dry, no evidence of rash  Psych: normal mood and affect

## 2020-07-02 NOTE — ED PROVIDER NOTE - CLINICAL SUMMARY MEDICAL DECISION MAKING FREE TEXT BOX
89yo F hx asthma, afib, TAVR p/w pna-like sx. Fever, sob, hypoxia w/ o2 requirement. Overall not in distress. R/o pna vs low suspicion ACS given AARON. Labs, xr, trop, ekg and admit for hypoxia 89yo F hx asthma, afib, TAVR p/w pna-like sx. Fever, sob, hypoxia w/ o2 requirement. Overall not in distress. R/o pna vs low suspicion ACS given AARON. Labs, xr, trop, ekg and admit for hypoxia    FABY Shaffer MD: Pt is an 89 y/o female with PMH breast CA, aortic stenosis s/p TAVR, afib on coumadin, htn, hld, asthma p/w generalized weakness, worsening AARON, and fever tmax 102 x 3 days. Went to PCP Dr. White and had cxr showing pna and sent to ED for further eval. Of note in triage found to be hypoxic to 88% on RA improved to 98% on 2L NC. Denies cough, cp, abd pain, n/v/d, back pain, leg pain/swelling. Ddx includes, however, is not limited to: pna, covid19, PE, chf, pleural effusions, asthma, other. Plan: basic labs, cardiac w/u, CTA chest, TBA

## 2020-07-02 NOTE — H&P ADULT - ADDITIONAL PE
T(F): 98.1 (02 Jul 2020 22:00), Max: 100.1 (02 Jul 2020 15:35)  HR: 67 - 100  BP: 94/66 - 114/59  RR: 17 - 20  SpO2: 93% - 99%

## 2020-07-02 NOTE — ED ADULT NURSE NOTE - NSIMPLEMENTINTERV_GEN_ALL_ED
Implemented All Fall with Harm Risk Interventions:  [place] to call system. Call bell, personal items and telephone within reach. Instruct patient to call for assistance. Room bathroom lighting operational. Non-slip footwear when patient is off stretcher. Physically safe environment: no spills, clutter or unnecessary equipment. Stretcher in lowest position, wheels locked, appropriate side rails in place. Provide visual cue, wrist band, yellow gown, etc. Monitor gait and stability. Monitor for mental status changes and reorient to person, place, and time. Review medications for side effects contributing to fall risk. Reinforce activity limits and safety measures with patient and family. Provide visual clues: red socks.

## 2020-07-02 NOTE — H&P ADULT - PROBLEM SELECTOR PLAN 10
I personally spent 16 min face-to-face discussing advance directives including but not limited to DNR/DNI, and plan of care wit patient in details.  Patient is FULL CODE and HCP is daughter, Awais Dodson [phone].

## 2020-07-02 NOTE — ED ADULT NURSE REASSESSMENT NOTE - NS ED NURSE REASSESS COMMENT FT1
COVID negative. CT performed, awaiting results. Pt awake and resting. Report given to Omayra RESENDEZ

## 2020-07-02 NOTE — H&P ADULT - PROBLEM SELECTOR PLAN 3
with weakness, fever, hypoxia and hypotension, possible ATN/pre-renal or worsening CHF, creatinine 0.4 in Feb 2018, now 1.39  - will monitor u/o, electrolytes and renal function for JANETT  - renally dose meds, hold ARB for now with weakness, fever, hypoxia and hypotension, possible ATN/pre-renal or worsening CHF, creatinine 0.4 in Feb 2018, now 1.39  - will monitor u/o, electrolytes and renal function for JANETT  - renally dose meds, hold ARB for now  - will consider renal eval in am

## 2020-07-02 NOTE — H&P ADULT - NSHPSOCIALHISTORY_GEN_ALL_CORE
Lives Lives alone, , retired anterior decorator, never smoked, no alcohol, ambulate with a walker at home

## 2020-07-02 NOTE — H&P ADULT - PROBLEM SELECTOR PLAN 5
s/p TAVR  - on diuretics (Torsemide 40mg po bid with Spironolactone) for unclear type of CHF  - will check TTE

## 2020-07-02 NOTE — ED ADULT NURSE REASSESSMENT NOTE - NS ED NURSE REASSESS COMMENT FT1
Report received from Geni RESENDEZ at 1500. Pt present to ED c/o generalized weakness. Geni RESENDEZ obtain bloodwork and covid swab and sent to lab. Pt given 500mL NSS for low BP and albuterol. Pending CXR and antibiotics. Pt resting comfortably. Will continue to monitor.

## 2020-07-02 NOTE — H&P ADULT - ASSESSMENT
87yo F hx breast CA, aortic stenosis s/p TAVR, afib on coumadin, htn, hld, asthma p/w generalized weakness, worsening AARON, and fever 87yo F hx breast CA, aortic stenosis s/p TAVR, afib on coumadin, htn, hld, asthma p/w generalized weakness, worsening AARON, and fever a/w hypoxia, hypotension, RODRIGUEZ and concern for sepsis of unclear source.

## 2020-07-02 NOTE — H&P ADULT - PROBLEM SELECTOR PLAN 4
- will rate control with Metoprolol and Cardizem  - hold Coumadin tonight, INR 4 currently, goal INR 2-3

## 2020-07-03 DIAGNOSIS — E04.1 NONTOXIC SINGLE THYROID NODULE: ICD-10-CM

## 2020-07-03 DIAGNOSIS — R09.02 HYPOXEMIA: ICD-10-CM

## 2020-07-03 DIAGNOSIS — Z71.89 OTHER SPECIFIED COUNSELING: ICD-10-CM

## 2020-07-03 DIAGNOSIS — N17.9 ACUTE KIDNEY FAILURE, UNSPECIFIED: ICD-10-CM

## 2020-07-03 DIAGNOSIS — C50.919 MALIGNANT NEOPLASM OF UNSPECIFIED SITE OF UNSPECIFIED FEMALE BREAST: ICD-10-CM

## 2020-07-03 DIAGNOSIS — Z29.9 ENCOUNTER FOR PROPHYLACTIC MEASURES, UNSPECIFIED: ICD-10-CM

## 2020-07-03 DIAGNOSIS — I35.0 NONRHEUMATIC AORTIC (VALVE) STENOSIS: ICD-10-CM

## 2020-07-03 DIAGNOSIS — J45.20 MILD INTERMITTENT ASTHMA, UNCOMPLICATED: ICD-10-CM

## 2020-07-03 DIAGNOSIS — I48.11 LONGSTANDING PERSISTENT ATRIAL FIBRILLATION: ICD-10-CM

## 2020-07-03 DIAGNOSIS — I95.89 OTHER HYPOTENSION: ICD-10-CM

## 2020-07-03 LAB
ANION GAP SERPL CALC-SCNC: 12 MMOL/L — SIGNIFICANT CHANGE UP (ref 5–17)
BASOPHILS # BLD AUTO: 0.02 K/UL — SIGNIFICANT CHANGE UP (ref 0–0.2)
BASOPHILS NFR BLD AUTO: 0.4 % — SIGNIFICANT CHANGE UP (ref 0–2)
BUN SERPL-MCNC: 34 MG/DL — HIGH (ref 7–23)
CALCIUM SERPL-MCNC: 9.2 MG/DL — SIGNIFICANT CHANGE UP (ref 8.4–10.5)
CHLORIDE SERPL-SCNC: 98 MMOL/L — SIGNIFICANT CHANGE UP (ref 96–108)
CO2 SERPL-SCNC: 28 MMOL/L — SIGNIFICANT CHANGE UP (ref 22–31)
CREAT ?TM UR-MCNC: 65 MG/DL — SIGNIFICANT CHANGE UP
CREAT SERPL-MCNC: 1.13 MG/DL — SIGNIFICANT CHANGE UP (ref 0.5–1.3)
EOSINOPHIL # BLD AUTO: 0.01 K/UL — SIGNIFICANT CHANGE UP (ref 0–0.5)
EOSINOPHIL NFR BLD AUTO: 0.2 % — SIGNIFICANT CHANGE UP (ref 0–6)
GLUCOSE SERPL-MCNC: 101 MG/DL — HIGH (ref 70–99)
HCT VFR BLD CALC: 38.9 % — SIGNIFICANT CHANGE UP (ref 34.5–45)
HGB BLD-MCNC: 12.3 G/DL — SIGNIFICANT CHANGE UP (ref 11.5–15.5)
IMM GRANULOCYTES NFR BLD AUTO: 1.4 % — SIGNIFICANT CHANGE UP (ref 0–1.5)
INR BLD: 2.81 RATIO — HIGH (ref 0.88–1.16)
LYMPHOCYTES # BLD AUTO: 0.39 K/UL — LOW (ref 1–3.3)
LYMPHOCYTES # BLD AUTO: 6.9 % — LOW (ref 13–44)
MCHC RBC-ENTMCNC: 30.8 PG — SIGNIFICANT CHANGE UP (ref 27–34)
MCHC RBC-ENTMCNC: 31.6 GM/DL — LOW (ref 32–36)
MCV RBC AUTO: 97.5 FL — SIGNIFICANT CHANGE UP (ref 80–100)
MONOCYTES # BLD AUTO: 0.62 K/UL — SIGNIFICANT CHANGE UP (ref 0–0.9)
MONOCYTES NFR BLD AUTO: 10.9 % — SIGNIFICANT CHANGE UP (ref 2–14)
NEUTROPHILS # BLD AUTO: 4.55 K/UL — SIGNIFICANT CHANGE UP (ref 1.8–7.4)
NEUTROPHILS NFR BLD AUTO: 80.2 % — HIGH (ref 43–77)
NRBC # BLD: 0 /100 WBCS — SIGNIFICANT CHANGE UP (ref 0–0)
NT-PROBNP SERPL-SCNC: 4112 PG/ML — HIGH (ref 0–300)
PLATELET # BLD AUTO: 115 K/UL — LOW (ref 150–400)
POTASSIUM SERPL-MCNC: 3.8 MMOL/L — SIGNIFICANT CHANGE UP (ref 3.5–5.3)
POTASSIUM SERPL-SCNC: 3.8 MMOL/L — SIGNIFICANT CHANGE UP (ref 3.5–5.3)
PROTHROM AB SERPL-ACNC: 31.6 SEC — HIGH (ref 10.6–13.6)
RBC # BLD: 3.99 M/UL — SIGNIFICANT CHANGE UP (ref 3.8–5.2)
RBC # FLD: 13.6 % — SIGNIFICANT CHANGE UP (ref 10.3–14.5)
SARS-COV-2 IGG SERPL QL IA: NEGATIVE — SIGNIFICANT CHANGE UP
SARS-COV-2 IGM SERPL IA-ACNC: <3.8 AU/ML — SIGNIFICANT CHANGE UP
SODIUM SERPL-SCNC: 138 MMOL/L — SIGNIFICANT CHANGE UP (ref 135–145)
SODIUM UR-SCNC: <35 MMOL/L — SIGNIFICANT CHANGE UP
T3 SERPL-MCNC: 52 NG/DL — LOW (ref 80–200)
T4 AB SER-ACNC: 4.9 UG/DL — SIGNIFICANT CHANGE UP (ref 4.6–12)
TSH SERPL-MCNC: 0.4 UIU/ML — SIGNIFICANT CHANGE UP (ref 0.27–4.2)
WBC # BLD: 5.67 K/UL — SIGNIFICANT CHANGE UP (ref 3.8–10.5)
WBC # FLD AUTO: 5.67 K/UL — SIGNIFICANT CHANGE UP (ref 3.8–10.5)

## 2020-07-03 PROCEDURE — 99223 1ST HOSP IP/OBS HIGH 75: CPT

## 2020-07-03 PROCEDURE — 99222 1ST HOSP IP/OBS MODERATE 55: CPT | Mod: GC

## 2020-07-03 RX ORDER — POTASSIUM CHLORIDE 20 MEQ
1 PACKET (EA) ORAL
Qty: 0 | Refills: 0 | DISCHARGE

## 2020-07-03 RX ORDER — FOLIC ACID 0.8 MG
1 TABLET ORAL
Qty: 0 | Refills: 0 | DISCHARGE

## 2020-07-03 RX ORDER — WARFARIN SODIUM 2.5 MG/1
2 TABLET ORAL ONCE
Refills: 0 | Status: COMPLETED | OUTPATIENT
Start: 2020-07-03 | End: 2020-07-03

## 2020-07-03 RX ORDER — ISOSORBIDE MONONITRATE 60 MG/1
1 TABLET, EXTENDED RELEASE ORAL
Qty: 0 | Refills: 0 | DISCHARGE

## 2020-07-03 RX ORDER — LOSARTAN POTASSIUM 100 MG/1
50 TABLET, FILM COATED ORAL DAILY
Refills: 0 | Status: DISCONTINUED | OUTPATIENT
Start: 2020-07-03 | End: 2020-07-03

## 2020-07-03 RX ORDER — LETROZOLE 2.5 MG/1
1 TABLET, FILM COATED ORAL
Qty: 0 | Refills: 0 | DISCHARGE

## 2020-07-03 RX ORDER — MULTIVIT-MIN/FERROUS GLUCONATE 9 MG/15 ML
1 LIQUID (ML) ORAL DAILY
Refills: 0 | Status: DISCONTINUED | OUTPATIENT
Start: 2020-07-03 | End: 2020-07-07

## 2020-07-03 RX ORDER — DILTIAZEM HCL 120 MG
240 CAPSULE, EXT RELEASE 24 HR ORAL DAILY
Refills: 0 | Status: DISCONTINUED | OUTPATIENT
Start: 2020-07-03 | End: 2020-07-07

## 2020-07-03 RX ORDER — ASPIRIN/CALCIUM CARB/MAGNESIUM 324 MG
81 TABLET ORAL DAILY
Refills: 0 | Status: DISCONTINUED | OUTPATIENT
Start: 2020-07-03 | End: 2020-07-07

## 2020-07-03 RX ORDER — METOPROLOL TARTRATE 50 MG
1 TABLET ORAL
Qty: 0 | Refills: 0 | DISCHARGE

## 2020-07-03 RX ORDER — SERTRALINE 25 MG/1
1 TABLET, FILM COATED ORAL
Qty: 0 | Refills: 0 | DISCHARGE

## 2020-07-03 RX ORDER — MULTIVIT-MIN/FERROUS GLUCONATE 9 MG/15 ML
1 LIQUID (ML) ORAL
Qty: 0 | Refills: 0 | DISCHARGE

## 2020-07-03 RX ORDER — ISOSORBIDE MONONITRATE 60 MG/1
30 TABLET, EXTENDED RELEASE ORAL DAILY
Refills: 0 | Status: DISCONTINUED | OUTPATIENT
Start: 2020-07-03 | End: 2020-07-07

## 2020-07-03 RX ORDER — ATORVASTATIN CALCIUM 80 MG/1
10 TABLET, FILM COATED ORAL AT BEDTIME
Refills: 0 | Status: DISCONTINUED | OUTPATIENT
Start: 2020-07-03 | End: 2020-07-07

## 2020-07-03 RX ORDER — CHOLECALCIFEROL (VITAMIN D3) 125 MCG
1 CAPSULE ORAL
Qty: 0 | Refills: 0 | DISCHARGE

## 2020-07-03 RX ORDER — FUROSEMIDE 40 MG
40 TABLET ORAL
Refills: 0 | Status: DISCONTINUED | OUTPATIENT
Start: 2020-07-03 | End: 2020-07-06

## 2020-07-03 RX ORDER — METOPROLOL TARTRATE 50 MG
50 TABLET ORAL
Refills: 0 | Status: DISCONTINUED | OUTPATIENT
Start: 2020-07-03 | End: 2020-07-07

## 2020-07-03 RX ORDER — FOLIC ACID 0.8 MG
1 TABLET ORAL DAILY
Refills: 0 | Status: DISCONTINUED | OUTPATIENT
Start: 2020-07-03 | End: 2020-07-07

## 2020-07-03 RX ORDER — LETROZOLE 2.5 MG/1
2.5 TABLET, FILM COATED ORAL DAILY
Refills: 0 | Status: DISCONTINUED | OUTPATIENT
Start: 2020-07-03 | End: 2020-07-07

## 2020-07-03 RX ORDER — SERTRALINE 25 MG/1
100 TABLET, FILM COATED ORAL DAILY
Refills: 0 | Status: DISCONTINUED | OUTPATIENT
Start: 2020-07-03 | End: 2020-07-07

## 2020-07-03 RX ORDER — FERROUS SULFATE 325(65) MG
325 TABLET ORAL DAILY
Refills: 0 | Status: DISCONTINUED | OUTPATIENT
Start: 2020-07-03 | End: 2020-07-07

## 2020-07-03 RX ORDER — IPRATROPIUM/ALBUTEROL SULFATE 18-103MCG
3 AEROSOL WITH ADAPTER (GRAM) INHALATION EVERY 6 HOURS
Refills: 0 | Status: DISCONTINUED | OUTPATIENT
Start: 2020-07-03 | End: 2020-07-07

## 2020-07-03 RX ORDER — FERROUS SULFATE 325(65) MG
1 TABLET ORAL
Qty: 0 | Refills: 0 | DISCHARGE

## 2020-07-03 RX ADMIN — LETROZOLE 2.5 MILLIGRAM(S): 2.5 TABLET, FILM COATED ORAL at 14:39

## 2020-07-03 RX ADMIN — Medication 40 MILLIGRAM(S): at 05:10

## 2020-07-03 RX ADMIN — Medication 3 MILLILITER(S): at 05:10

## 2020-07-03 RX ADMIN — Medication 40 MILLIGRAM(S): at 18:14

## 2020-07-03 RX ADMIN — Medication 3 MILLILITER(S): at 01:45

## 2020-07-03 RX ADMIN — Medication 50 MILLIGRAM(S): at 18:14

## 2020-07-03 RX ADMIN — ATORVASTATIN CALCIUM 10 MILLIGRAM(S): 80 TABLET, FILM COATED ORAL at 21:59

## 2020-07-03 RX ADMIN — Medication 81 MILLIGRAM(S): at 14:40

## 2020-07-03 RX ADMIN — Medication 1 TABLET(S): at 14:40

## 2020-07-03 RX ADMIN — WARFARIN SODIUM 2 MILLIGRAM(S): 2.5 TABLET ORAL at 21:59

## 2020-07-03 RX ADMIN — Medication 50 MILLIGRAM(S): at 05:10

## 2020-07-03 RX ADMIN — Medication 3 MILLILITER(S): at 14:40

## 2020-07-03 RX ADMIN — SERTRALINE 100 MILLIGRAM(S): 25 TABLET, FILM COATED ORAL at 14:40

## 2020-07-03 RX ADMIN — Medication 325 MILLIGRAM(S): at 14:39

## 2020-07-03 RX ADMIN — Medication 3 MILLILITER(S): at 23:17

## 2020-07-03 RX ADMIN — Medication 3 MILLILITER(S): at 18:14

## 2020-07-03 RX ADMIN — Medication 240 MILLIGRAM(S): at 05:11

## 2020-07-03 RX ADMIN — Medication 1 MILLIGRAM(S): at 14:40

## 2020-07-03 NOTE — CONSULT NOTE ADULT - SUBJECTIVE AND OBJECTIVE BOX
· Subjective and Objective:   [facility] CARDIOLOGY CONSULTANTS:    Camron Rivera, Roland Villarreal, Alex Anthony, Gurpreet Cooney      [phone]    CHIEF COMPLAINT: Patient is a 88y old  Female who presents with a chief complaint of AARON and fever (02 Jul 2020 22:11)    89yo F PMH AS s/p TAVR 12/2017 at [facility], AF on coumadin, htn, hld, L breast CA on Letrozole, asthma p/w generalized weakness, worsening AARON, and fever tmax 102 x 3 days. Patient to see PMD and was told she has PNA, sent to ED for further management.  Patient denies cough, cp, abd pain, n/v/d, back pain, leg pain/swelling, sick/COVID contact.  CTA chest was negative for PE and PNA  BNP 6998 --> 4112  HStrop 21 --> 19  TELEMETRY: AF 70-90  She received 1L fluid bolus but is now on lasix 40mb IV bid    Pt laying/sleeping flat this morning in bed in NAD.      ROS otherwise negative unless noted        PAST MEDICAL & SURGICAL HISTORY:  Morbid obesity  Aortic stenosis: moderate  Atrial fibrillation  Spinal stenosis  HLD (hyperlipidemia)  HTN (hypertension)  Asthma  History of cataract surgery, left  Lipoma  Hx of hysterectomy      MEDICATIONS  (STANDING):  albuterol/ipratropium for Nebulization 3 milliLiter(s) Nebulizer every 6 hours  aspirin enteric coated 81 milliGRAM(s) Oral daily  diltiazem    milliGRAM(s) Oral daily  ferrous    sulfate 325 milliGRAM(s) Oral daily  folic acid 1 milliGRAM(s) Oral daily  furosemide   Injectable 40 milliGRAM(s) IV Push two times a day  isosorbide   mononitrate ER Tablet (IMDUR) 30 milliGRAM(s) Oral daily  letrozole 2.5 milliGRAM(s) Oral daily  metoprolol tartrate 50 milliGRAM(s) Oral two times a day  multivitamin/minerals 1 Tablet(s) Oral daily  sertraline 100 milliGRAM(s) Oral daily      Allergies    caffeine (Unknown)  menthol topical (Unknown)  Originally Entered as [Unknown] reaction to [MENTHOL] (Unknown)  sulfa (Unknown)  sulfa drugs (Unknown)    Intolerances                              12.3   5.67  )-----------( 115      ( 03 Jul 2020 06:45 )             38.9       07-03    138  |  98  |  34<H>  ----------------------------<  101<H>  3.8   |  28  |  1.13    Ca    9.2      03 Jul 2020 06:45    TPro  7.1  /  Alb  4.1  /  TBili  0.6  /  DBili  x   /  AST  16  /  ALT  9<L>  /  AlkPhos  58  07-02      LIVER FUNCTIONS - ( 02 Jul 2020 15:15 )  Alb: 4.1 g/dL / Pro: 7.1 g/dL / ALK PHOS: 58 U/L / ALT: 9 U/L / AST: 16 U/L / GGT: x             PT/INR - ( 02 Jul 2020 15:15 )   PT: 46.5 sec;   INR: 4.34 ratio         PTT - ( 02 Jul 2020 15:15 )  PTT:40.1 sec                      Daily Height in cm: 160.02 (02 Jul 2020 13:58)    Daily     I&O's Summary      Vital Signs Last 24 Hrs  T(C): 36.9 (03 Jul 2020 04:10), Max: 37.8 (02 Jul 2020 15:35)  T(F): 98.5 (03 Jul 2020 04:10), Max: 100.1 (02 Jul 2020 15:35)  HR: 79 (03 Jul 2020 04:10) (67 - 100)  BP: 112/70 (03 Jul 2020 04:10) (94/66 - 114/59)  BP(mean): --  RR: 18 (03 Jul 2020 04:10) (17 - 20)  SpO2: 99% (03 Jul 2020 04:10) (93% - 99%)    PHYSICAL EXAM:   · Constitutional	Well-developed, well nourished  · Eyes	EOMI; PERRL; no drainage or redness  · ENMT	No oral lesions; no gross abnormalities  · Neck	No bruits; no thyromegaly or nodules  · Respiratory	Normal breath sounds b/l, No RRW  · Cardiovascular	Regular rate & rhythm, normal S1, S2; no murmurs, gallops or rubs; no S3, S4  · Gastrointestinal	Soft, non-tender, no hepatosplenomegaly, normal bowel sounds  · Extremities	No cyanosis, clubbing or edema  · Vascular	Equal and normal pulses (carotid, femoral, dorsalis pedis)  · Neurological	Alert & oriented; no sensory, motor or coordination deficits, normal reflexes

## 2020-07-03 NOTE — CONSULT NOTE ADULT - ASSESSMENT
Mrs Trevino is an 88 year old woman with breast cancer on letrozole, TAVR, Afib who presented with fevers and AARON. ID consulted for fevers. No clear infectious source at this time, but am concerned by her recent dental visit without prior antibiotics. No leukocytosis.     Fevers  - Check one additional set on blood cultures   - Check ESR, CRP, RVP, and TSH  - Monitor off antibiotics for now  - Follow up cultures  - Monitor for fevers  - Trend WBCs    Nisa Mehta, PGY-5  Infectious Disease Fellow   Pager: [phone]  After 5pm/weekends: [phone] Mrs Trevino is an 88 year old woman with breast cancer on letrozole, TAVR, Afib who presented with fevers and AARON. ID consulted for fevers. No clear infectious source at this time, but am concerned by her recent dental visit without prior antibiotics. No leukocytosis.     Fevers, SOB, RODRIGUEZ     Plan:   - Check one additional set on blood cultures   - Check ESR, CRP, RVP, and TSH  - Monitor off antibiotics for now  - Follow up cultures  - Monitor for fevers  - Trend WBCs  - work up for thyroid nodules per primary   - RODRIGUEZ improving, trend Cr     Nisa Mehta, PGY-5  Infectious Disease Fellow   Pager: [phone]  After 5pm/weekends: [phone]

## 2020-07-03 NOTE — CONSULT NOTE ADULT - ASSESSMENT
87yo F PMH AS s/p TAVR 12/2017 at [facility], unknown LV function, AF on coumadin, htn, hld, L breast CA on Letrozole, asthma p/w generalized weakness, worsening AARON, and fever tmax 102 x 3 days.  Unclear etiology for infection    - Currently no s/s cardiac ischemia  - she appears volume overloaded, agree with lasix 40mg IV bid, strict I&Os, daily weights  - check TTE  - check bld cultures    TAVR  - c/w ASA  - check TTE to assess LV function and evaluate valve and for signs of IE  - bld cx pending    AF  - rate well controlled now  - c/w tele  - c/w dilt and metoprolol for rate control  - AC with coumadin    HTN  - c/w BB, CCB, imdur  - monitor BP closely in the setting of infection    HLD  - restart statin    monitor electrolytes - keep K>4, Mg>2    Thank you for this consult, will follow along with you!

## 2020-07-04 LAB
ANION GAP SERPL CALC-SCNC: 10 MMOL/L — SIGNIFICANT CHANGE UP (ref 5–17)
APPEARANCE UR: CLEAR — SIGNIFICANT CHANGE UP
BACTERIA # UR AUTO: NEGATIVE — SIGNIFICANT CHANGE UP
BILIRUB UR-MCNC: NEGATIVE — SIGNIFICANT CHANGE UP
BUN SERPL-MCNC: 38 MG/DL — HIGH (ref 7–23)
CALCIUM SERPL-MCNC: 9.3 MG/DL — SIGNIFICANT CHANGE UP (ref 8.4–10.5)
CHLORIDE SERPL-SCNC: 98 MMOL/L — SIGNIFICANT CHANGE UP (ref 96–108)
CO2 SERPL-SCNC: 29 MMOL/L — SIGNIFICANT CHANGE UP (ref 22–31)
COLOR SPEC: YELLOW — SIGNIFICANT CHANGE UP
CREAT SERPL-MCNC: 1.11 MG/DL — SIGNIFICANT CHANGE UP (ref 0.5–1.3)
CULTURE RESULTS: SIGNIFICANT CHANGE UP
DIFF PNL FLD: NEGATIVE — SIGNIFICANT CHANGE UP
EPI CELLS # UR: 4 /HPF — SIGNIFICANT CHANGE UP (ref 0–5)
GLUCOSE SERPL-MCNC: 108 MG/DL — HIGH (ref 70–99)
GLUCOSE UR QL: NEGATIVE — SIGNIFICANT CHANGE UP
HCT VFR BLD CALC: 37.5 % — SIGNIFICANT CHANGE UP (ref 34.5–45)
HGB BLD-MCNC: 11.9 G/DL — SIGNIFICANT CHANGE UP (ref 11.5–15.5)
HYALINE CASTS # UR AUTO: 2 /LPF — SIGNIFICANT CHANGE UP (ref 0–7)
INR BLD: 2.24 RATIO — HIGH (ref 0.88–1.16)
KETONES UR-MCNC: NEGATIVE — SIGNIFICANT CHANGE UP
LEUKOCYTE ESTERASE UR-ACNC: ABNORMAL
MCHC RBC-ENTMCNC: 31.1 PG — SIGNIFICANT CHANGE UP (ref 27–34)
MCHC RBC-ENTMCNC: 31.7 GM/DL — LOW (ref 32–36)
MCV RBC AUTO: 97.9 FL — SIGNIFICANT CHANGE UP (ref 80–100)
NITRITE UR-MCNC: NEGATIVE — SIGNIFICANT CHANGE UP
NRBC # BLD: 0 /100 WBCS — SIGNIFICANT CHANGE UP (ref 0–0)
PH UR: 6 — SIGNIFICANT CHANGE UP (ref 5–8)
PLATELET # BLD AUTO: 116 K/UL — LOW (ref 150–400)
POTASSIUM SERPL-MCNC: 3.9 MMOL/L — SIGNIFICANT CHANGE UP (ref 3.5–5.3)
POTASSIUM SERPL-SCNC: 3.9 MMOL/L — SIGNIFICANT CHANGE UP (ref 3.5–5.3)
PROT UR-MCNC: SIGNIFICANT CHANGE UP
PROTHROM AB SERPL-ACNC: 25.6 SEC — HIGH (ref 10.6–13.6)
RBC # BLD: 3.83 M/UL — SIGNIFICANT CHANGE UP (ref 3.8–5.2)
RBC # FLD: 13.5 % — SIGNIFICANT CHANGE UP (ref 10.3–14.5)
RBC CASTS # UR COMP ASSIST: 3 /HPF — SIGNIFICANT CHANGE UP (ref 0–4)
SODIUM SERPL-SCNC: 137 MMOL/L — SIGNIFICANT CHANGE UP (ref 135–145)
SP GR SPEC: 1.03 — HIGH (ref 1.01–1.02)
SPECIMEN SOURCE: SIGNIFICANT CHANGE UP
UROBILINOGEN FLD QL: SIGNIFICANT CHANGE UP
UUN UR-MCNC: 586 MG/DL — SIGNIFICANT CHANGE UP
WBC # BLD: 4.78 K/UL — SIGNIFICANT CHANGE UP (ref 3.8–10.5)
WBC # FLD AUTO: 4.78 K/UL — SIGNIFICANT CHANGE UP (ref 3.8–10.5)
WBC UR QL: 35 /HPF — HIGH (ref 0–5)

## 2020-07-04 PROCEDURE — 99233 SBSQ HOSP IP/OBS HIGH 50: CPT

## 2020-07-04 PROCEDURE — 93306 TTE W/DOPPLER COMPLETE: CPT | Mod: 26

## 2020-07-04 RX ORDER — WARFARIN SODIUM 2.5 MG/1
2.5 TABLET ORAL ONCE
Refills: 0 | Status: COMPLETED | OUTPATIENT
Start: 2020-07-04 | End: 2020-07-04

## 2020-07-04 RX ORDER — POTASSIUM CHLORIDE 20 MEQ
20 PACKET (EA) ORAL ONCE
Refills: 0 | Status: COMPLETED | OUTPATIENT
Start: 2020-07-04 | End: 2020-07-04

## 2020-07-04 RX ADMIN — WARFARIN SODIUM 2.5 MILLIGRAM(S): 2.5 TABLET ORAL at 22:47

## 2020-07-04 RX ADMIN — Medication 1 MILLIGRAM(S): at 11:49

## 2020-07-04 RX ADMIN — Medication 40 MILLIGRAM(S): at 05:56

## 2020-07-04 RX ADMIN — Medication 3 MILLILITER(S): at 05:56

## 2020-07-04 RX ADMIN — Medication 3 MILLILITER(S): at 11:49

## 2020-07-04 RX ADMIN — Medication 325 MILLIGRAM(S): at 11:49

## 2020-07-04 RX ADMIN — Medication 20 MILLIEQUIVALENT(S): at 11:49

## 2020-07-04 RX ADMIN — Medication 40 MILLIGRAM(S): at 18:11

## 2020-07-04 RX ADMIN — Medication 3 MILLILITER(S): at 18:11

## 2020-07-04 RX ADMIN — Medication 50 MILLIGRAM(S): at 18:11

## 2020-07-04 RX ADMIN — Medication 1 TABLET(S): at 11:49

## 2020-07-04 RX ADMIN — SERTRALINE 100 MILLIGRAM(S): 25 TABLET, FILM COATED ORAL at 11:49

## 2020-07-04 RX ADMIN — ISOSORBIDE MONONITRATE 30 MILLIGRAM(S): 60 TABLET, EXTENDED RELEASE ORAL at 12:04

## 2020-07-04 RX ADMIN — LETROZOLE 2.5 MILLIGRAM(S): 2.5 TABLET, FILM COATED ORAL at 11:49

## 2020-07-04 RX ADMIN — Medication 50 MILLIGRAM(S): at 05:56

## 2020-07-04 RX ADMIN — Medication 3 MILLILITER(S): at 22:50

## 2020-07-04 RX ADMIN — ATORVASTATIN CALCIUM 10 MILLIGRAM(S): 80 TABLET, FILM COATED ORAL at 22:47

## 2020-07-04 RX ADMIN — Medication 240 MILLIGRAM(S): at 05:56

## 2020-07-04 RX ADMIN — Medication 81 MILLIGRAM(S): at 11:49

## 2020-07-04 NOTE — PHYSICAL THERAPY INITIAL EVALUATION ADULT - PLANNED THERAPY INTERVENTIONS, PT EVAL
1. GOAL: Pt will be able to negotiate 3 steps +HR independently with reciprocal pattern in 3 weeks./gait training/bed mobility training/transfer training

## 2020-07-04 NOTE — PHYSICAL THERAPY INITIAL EVALUATION ADULT - ADDITIONAL COMMENTS
Pt reports living alone in a private home, +3 steps to enter with use of handrail. Pt owns a chairlift once inside the house. PTA, pt ambulates with a rolling walker inside the house. Pt has home O2 that she uses PRN. Her daughter lives only 4 blocks away and does pt's grocery shopping and can provide assistance if necessary.

## 2020-07-04 NOTE — PHYSICAL THERAPY INITIAL EVALUATION ADULT - PERTINENT HX OF CURRENT PROBLEM, REHAB EVAL
Pt is an 89 y/o F with PMH of L breast CA, aortic stenosis s/p TAVR, afib on coumadin, HTN, & asthma. She presented with generalized weakness, worsening AARON, and fever tmax 102 x3 days. Went to see PMD and was told she has PNA, sent to ED for further management. Pt denied cough, CP, ABD pain, n/v/d, back pain, leg pain/swelling, sick/COVID contact.

## 2020-07-05 LAB
ANION GAP SERPL CALC-SCNC: 9 MMOL/L — SIGNIFICANT CHANGE UP (ref 5–17)
APTT BLD: 34.5 SEC — SIGNIFICANT CHANGE UP (ref 27.5–35.5)
BUN SERPL-MCNC: 41 MG/DL — HIGH (ref 7–23)
CALCIUM SERPL-MCNC: 9.4 MG/DL — SIGNIFICANT CHANGE UP (ref 8.4–10.5)
CHLORIDE SERPL-SCNC: 100 MMOL/L — SIGNIFICANT CHANGE UP (ref 96–108)
CO2 SERPL-SCNC: 28 MMOL/L — SIGNIFICANT CHANGE UP (ref 22–31)
CREAT SERPL-MCNC: 1 MG/DL — SIGNIFICANT CHANGE UP (ref 0.5–1.3)
GLUCOSE SERPL-MCNC: 115 MG/DL — HIGH (ref 70–99)
HCT VFR BLD CALC: 35.1 % — SIGNIFICANT CHANGE UP (ref 34.5–45)
HGB BLD-MCNC: 11.3 G/DL — LOW (ref 11.5–15.5)
INR BLD: 1.89 RATIO — HIGH (ref 0.88–1.16)
MAGNESIUM SERPL-MCNC: 2.4 MG/DL — SIGNIFICANT CHANGE UP (ref 1.6–2.6)
MCHC RBC-ENTMCNC: 31 PG — SIGNIFICANT CHANGE UP (ref 27–34)
MCHC RBC-ENTMCNC: 32.2 GM/DL — SIGNIFICANT CHANGE UP (ref 32–36)
MCV RBC AUTO: 96.4 FL — SIGNIFICANT CHANGE UP (ref 80–100)
NRBC # BLD: 0 /100 WBCS — SIGNIFICANT CHANGE UP (ref 0–0)
PLATELET # BLD AUTO: 129 K/UL — LOW (ref 150–400)
POTASSIUM SERPL-MCNC: 3.9 MMOL/L — SIGNIFICANT CHANGE UP (ref 3.5–5.3)
POTASSIUM SERPL-SCNC: 3.9 MMOL/L — SIGNIFICANT CHANGE UP (ref 3.5–5.3)
PROTHROM AB SERPL-ACNC: 21.6 SEC — HIGH (ref 10.6–13.6)
RBC # BLD: 3.64 M/UL — LOW (ref 3.8–5.2)
RBC # FLD: 13.2 % — SIGNIFICANT CHANGE UP (ref 10.3–14.5)
SODIUM SERPL-SCNC: 137 MMOL/L — SIGNIFICANT CHANGE UP (ref 135–145)
WBC # BLD: 5.4 K/UL — SIGNIFICANT CHANGE UP (ref 3.8–10.5)
WBC # FLD AUTO: 5.4 K/UL — SIGNIFICANT CHANGE UP (ref 3.8–10.5)

## 2020-07-05 PROCEDURE — 99233 SBSQ HOSP IP/OBS HIGH 50: CPT

## 2020-07-05 RX ORDER — WARFARIN SODIUM 2.5 MG/1
5 TABLET ORAL ONCE
Refills: 0 | Status: COMPLETED | OUTPATIENT
Start: 2020-07-05 | End: 2020-07-05

## 2020-07-05 RX ORDER — POTASSIUM CHLORIDE 20 MEQ
20 PACKET (EA) ORAL ONCE
Refills: 0 | Status: COMPLETED | OUTPATIENT
Start: 2020-07-05 | End: 2020-07-05

## 2020-07-05 RX ORDER — ENOXAPARIN SODIUM 100 MG/ML
90 INJECTION SUBCUTANEOUS
Refills: 0 | Status: DISCONTINUED | OUTPATIENT
Start: 2020-07-05 | End: 2020-07-06

## 2020-07-05 RX ADMIN — Medication 1 MILLIGRAM(S): at 11:43

## 2020-07-05 RX ADMIN — Medication 50 MILLIGRAM(S): at 17:39

## 2020-07-05 RX ADMIN — Medication 81 MILLIGRAM(S): at 11:44

## 2020-07-05 RX ADMIN — WARFARIN SODIUM 5 MILLIGRAM(S): 2.5 TABLET ORAL at 22:37

## 2020-07-05 RX ADMIN — LETROZOLE 2.5 MILLIGRAM(S): 2.5 TABLET, FILM COATED ORAL at 11:43

## 2020-07-05 RX ADMIN — ENOXAPARIN SODIUM 90 MILLIGRAM(S): 100 INJECTION SUBCUTANEOUS at 17:38

## 2020-07-05 RX ADMIN — Medication 40 MILLIGRAM(S): at 05:50

## 2020-07-05 RX ADMIN — Medication 3 MILLILITER(S): at 17:39

## 2020-07-05 RX ADMIN — Medication 325 MILLIGRAM(S): at 11:43

## 2020-07-05 RX ADMIN — Medication 3 MILLILITER(S): at 11:42

## 2020-07-05 RX ADMIN — Medication 20 MILLIEQUIVALENT(S): at 11:40

## 2020-07-05 RX ADMIN — Medication 50 MILLIGRAM(S): at 05:50

## 2020-07-05 RX ADMIN — Medication 40 MILLIGRAM(S): at 17:39

## 2020-07-05 RX ADMIN — ATORVASTATIN CALCIUM 10 MILLIGRAM(S): 80 TABLET, FILM COATED ORAL at 22:37

## 2020-07-05 RX ADMIN — Medication 240 MILLIGRAM(S): at 05:49

## 2020-07-05 RX ADMIN — Medication 1 TABLET(S): at 11:43

## 2020-07-05 RX ADMIN — ISOSORBIDE MONONITRATE 30 MILLIGRAM(S): 60 TABLET, EXTENDED RELEASE ORAL at 11:43

## 2020-07-05 RX ADMIN — Medication 3 MILLILITER(S): at 22:37

## 2020-07-05 RX ADMIN — SERTRALINE 100 MILLIGRAM(S): 25 TABLET, FILM COATED ORAL at 11:44

## 2020-07-06 ENCOUNTER — TRANSCRIPTION ENCOUNTER (OUTPATIENT)
Age: 85
End: 2020-07-06

## 2020-07-06 LAB
ANION GAP SERPL CALC-SCNC: 9 MMOL/L — SIGNIFICANT CHANGE UP (ref 5–17)
APTT BLD: 40.1 SEC — HIGH (ref 27.5–35.5)
BUN SERPL-MCNC: 42 MG/DL — HIGH (ref 7–23)
CALCIUM SERPL-MCNC: 9.6 MG/DL — SIGNIFICANT CHANGE UP (ref 8.4–10.5)
CHLORIDE SERPL-SCNC: 100 MMOL/L — SIGNIFICANT CHANGE UP (ref 96–108)
CO2 SERPL-SCNC: 27 MMOL/L — SIGNIFICANT CHANGE UP (ref 22–31)
CREAT SERPL-MCNC: 1.02 MG/DL — SIGNIFICANT CHANGE UP (ref 0.5–1.3)
GLUCOSE SERPL-MCNC: 110 MG/DL — HIGH (ref 70–99)
HCT VFR BLD CALC: 34.6 % — SIGNIFICANT CHANGE UP (ref 34.5–45)
HGB BLD-MCNC: 11.3 G/DL — LOW (ref 11.5–15.5)
INR BLD: 1.98 RATIO — HIGH (ref 0.88–1.16)
MAGNESIUM SERPL-MCNC: 2.3 MG/DL — SIGNIFICANT CHANGE UP (ref 1.6–2.6)
MCHC RBC-ENTMCNC: 31.3 PG — SIGNIFICANT CHANGE UP (ref 27–34)
MCHC RBC-ENTMCNC: 32.7 GM/DL — SIGNIFICANT CHANGE UP (ref 32–36)
MCV RBC AUTO: 95.8 FL — SIGNIFICANT CHANGE UP (ref 80–100)
NRBC # BLD: 0 /100 WBCS — SIGNIFICANT CHANGE UP (ref 0–0)
PLATELET # BLD AUTO: 130 K/UL — LOW (ref 150–400)
POTASSIUM SERPL-MCNC: 3.8 MMOL/L — SIGNIFICANT CHANGE UP (ref 3.5–5.3)
POTASSIUM SERPL-SCNC: 3.8 MMOL/L — SIGNIFICANT CHANGE UP (ref 3.5–5.3)
PROTHROM AB SERPL-ACNC: 22.6 SEC — HIGH (ref 10.6–13.6)
RBC # BLD: 3.61 M/UL — LOW (ref 3.8–5.2)
RBC # FLD: 13.2 % — SIGNIFICANT CHANGE UP (ref 10.3–14.5)
SODIUM SERPL-SCNC: 136 MMOL/L — SIGNIFICANT CHANGE UP (ref 135–145)
WBC # BLD: 6.38 K/UL — SIGNIFICANT CHANGE UP (ref 3.8–10.5)
WBC # FLD AUTO: 6.38 K/UL — SIGNIFICANT CHANGE UP (ref 3.8–10.5)

## 2020-07-06 PROCEDURE — 99233 SBSQ HOSP IP/OBS HIGH 50: CPT

## 2020-07-06 RX ORDER — WARFARIN SODIUM 2.5 MG/1
2.5 TABLET ORAL ONCE
Refills: 0 | Status: DISCONTINUED | OUTPATIENT
Start: 2020-07-06 | End: 2020-07-06

## 2020-07-06 RX ORDER — WARFARIN SODIUM 2.5 MG/1
2 TABLET ORAL ONCE
Refills: 0 | Status: COMPLETED | OUTPATIENT
Start: 2020-07-06 | End: 2020-07-06

## 2020-07-06 RX ORDER — BUDESONIDE AND FORMOTEROL FUMARATE DIHYDRATE 160; 4.5 UG/1; UG/1
2 AEROSOL RESPIRATORY (INHALATION)
Refills: 0 | Status: DISCONTINUED | OUTPATIENT
Start: 2020-07-06 | End: 2020-07-07

## 2020-07-06 RX ADMIN — Medication 50 MILLIGRAM(S): at 17:44

## 2020-07-06 RX ADMIN — Medication 3 MILLILITER(S): at 13:34

## 2020-07-06 RX ADMIN — Medication 50 MILLIGRAM(S): at 05:49

## 2020-07-06 RX ADMIN — ISOSORBIDE MONONITRATE 30 MILLIGRAM(S): 60 TABLET, EXTENDED RELEASE ORAL at 13:36

## 2020-07-06 RX ADMIN — Medication 1 MILLIGRAM(S): at 13:35

## 2020-07-06 RX ADMIN — LETROZOLE 2.5 MILLIGRAM(S): 2.5 TABLET, FILM COATED ORAL at 13:35

## 2020-07-06 RX ADMIN — WARFARIN SODIUM 2 MILLIGRAM(S): 2.5 TABLET ORAL at 21:28

## 2020-07-06 RX ADMIN — Medication 1 TABLET(S): at 13:34

## 2020-07-06 RX ADMIN — ATORVASTATIN CALCIUM 10 MILLIGRAM(S): 80 TABLET, FILM COATED ORAL at 21:28

## 2020-07-06 RX ADMIN — Medication 40 MILLIGRAM(S): at 17:44

## 2020-07-06 RX ADMIN — Medication 40 MILLIGRAM(S): at 05:49

## 2020-07-06 RX ADMIN — Medication 3 MILLILITER(S): at 22:42

## 2020-07-06 RX ADMIN — Medication 3 MILLILITER(S): at 17:44

## 2020-07-06 RX ADMIN — Medication 240 MILLIGRAM(S): at 05:49

## 2020-07-06 RX ADMIN — Medication 325 MILLIGRAM(S): at 13:35

## 2020-07-06 RX ADMIN — Medication 81 MILLIGRAM(S): at 13:35

## 2020-07-06 RX ADMIN — BUDESONIDE AND FORMOTEROL FUMARATE DIHYDRATE 2 PUFF(S): 160; 4.5 AEROSOL RESPIRATORY (INHALATION) at 17:44

## 2020-07-06 RX ADMIN — SERTRALINE 100 MILLIGRAM(S): 25 TABLET, FILM COATED ORAL at 13:35

## 2020-07-06 RX ADMIN — ENOXAPARIN SODIUM 90 MILLIGRAM(S): 100 INJECTION SUBCUTANEOUS at 05:52

## 2020-07-06 NOTE — DISCHARGE NOTE PROVIDER - NSDCCPCAREPLAN_GEN_ALL_CORE_FT
PRINCIPAL DISCHARGE DIAGNOSIS  Diagnosis: Hypoxia  Assessment and Plan of Treatment: s/p abx course for PNA   pt. now breathing better   s/p iv diuresis         SECONDARY DISCHARGE DIAGNOSES  Diagnosis: RODRIGUEZ (acute kidney injury)  Assessment and Plan of Treatment: c/w meds as prescribed PRINCIPAL DISCHARGE DIAGNOSIS  Diagnosis: Hypoxia  Assessment and Plan of Treatment: s/p abx course for PNA   pt. now breathing better   s/p iv diuresis         SECONDARY DISCHARGE DIAGNOSES  Diagnosis: RODRIGUEZ (acute kidney injury)  Assessment and Plan of Treatment: c/w meds as prescribed   Avoid taking (NSAIDs) - (ex: Ibuprofen, Advil, Celebrex, Naprosyn)  Avoid taking any nephrotoxic agents (can harm kidneys) - Intravenous contrast for diagnostic testing, combination cold medications.  Have all medications adjusted for your renal function by your Health Care Provider.  Blood pressure control is important.  Take all medication as prescribed. PRINCIPAL DISCHARGE DIAGNOSIS  Diagnosis: Hypoxia  Assessment and Plan of Treatment: s/p abx course for PNA   pt. now breathing better   s/p iv diuresis         SECONDARY DISCHARGE DIAGNOSES  Diagnosis: Afib  Assessment and Plan of Treatment: Please have your INR checked in 3-5 days   Atrial fibrillation is the most common heart rhythm problem & has the risk of stroke & heart attack  It helps if you control your blood pressure, not drink more than 1-2 alcohol drinks per day, cut down on caffeine, getting treatment for over active thyroid gland, & getting exercise  Call your doctor if you feel your heart racing or beating unusually, chest tightness or pain, lightheaded, faint, shortness of breath especially with exercise  It is important to take your heart medication as prescribed  You may be on anticoagulation which is very important to take as directed - you may need blood work to monitor drug levels    Diagnosis: RODRIGUEZ (acute kidney injury)  Assessment and Plan of Treatment: c/w meds as prescribed   Avoid taking (NSAIDs) - (ex: Ibuprofen, Advil, Celebrex, Naprosyn)  Avoid taking any nephrotoxic agents (can harm kidneys) - Intravenous contrast for diagnostic testing, combination cold medications.  Have all medications adjusted for your renal function by your Health Care Provider.  Blood pressure control is important.  Take all medication as prescribed.

## 2020-07-06 NOTE — DISCHARGE NOTE PROVIDER - NSDCFUADDAPPT_GEN_ALL_CORE_FT
Please follow up with your PCP, Dr. Guevara in 2-3 days to have your INR check and also for further management including repeating blood work to monitor your renal function Please follow up with your PCP, Dr. Guevara in 2-3 days to have your INR check (your INR was elevated to 4.34 upon admission) and also for further management

## 2020-07-06 NOTE — DISCHARGE NOTE PROVIDER - NSDCMRMEDTOKEN_GEN_ALL_CORE_FT
aspirin 81 mg oral delayed release tablet: 1 tab(s) orally once a day  Centrum Adults oral tablet: 1 tab(s) orally once a day  Coumadin 2.5 mg oral tablet: 1 tab(s) orally 3 times a week  Coumadin 5 mg oral tablet: 1 tab(s) orally 4 times a week,  dilTIAZem 120 mg/24 hours oral capsule, extended release: 2 cap(s) orally once a day  ferrous sulfate 325 mg (65 mg elemental iron) oral delayed release tablet: 1 tab(s) orally once a day  folic acid 0.8 mg oral tablet: 1 tab(s) orally once a day  Imdur 30 mg oral tablet, extended release: 1 tab(s) orally once a day (in the morning)  K-Dur 10: 1  orally 3 times a day  letrozole 2.5 mg oral tablet: 1 tab(s) orally once a day  Lopressor 50 mg oral tablet: 1 tab(s) orally 2 times a day  losartan 100 mg oral tablet: 0.5 tab(s) orally once a day  pravastatin 20 mg oral tablet: 1 tab(s) orally once a day (at bedtime)  spironolactone 25 mg oral tablet: orally once a day  torsemide 20 mg oral tablet: 2 tab(s) orally 2 times a day  Vitamin B-100 oral tablet: 1 tab(s) orally once a day  Vitamin D3 10,000 intl units (250 mcg) oral capsule: 1  orally once a day  Zoloft 100 mg oral tablet: 1 tab(s) orally once a day aspirin 81 mg oral delayed release tablet: 1 tab(s) orally once a day  Centrum Adults oral tablet: 1 tab(s) orally once a day  Coumadin 2.5 mg oral tablet: 1 tab(s) orally 3 times a week  Coumadin 5 mg oral tablet: 1 tab(s) orally 4 times a week,  dilTIAZem 240 mg/24 hours oral capsule, extended release: 1 cap(s) orally once a day  ferrous sulfate 325 mg (65 mg elemental iron) oral delayed release tablet: 1 tab(s) orally once a day  folic acid 0.8 mg oral tablet: 1 tab(s) orally once a day  Imdur 30 mg oral tablet, extended release: 1 tab(s) orally once a day (in the morning)  K-Dur 10: 1  orally 3 times a day  letrozole 2.5 mg oral tablet: 1 tab(s) orally once a day  Lopressor 50 mg oral tablet: 1 tab(s) orally 2 times a day  pravastatin 20 mg oral tablet: 1 tab(s) orally once a day (at bedtime)  torsemide 20 mg oral tablet: 2 tab(s) orally 2 times a day  Vitamin B-100 oral tablet: 1 tab(s) orally once a day  Vitamin D3 10,000 intl units (250 mcg) oral capsule: 1  orally once a day  Zoloft 100 mg oral tablet: 1 tab(s) orally once a day aspirin 81 mg oral delayed release tablet: 1 tab(s) orally once a day  Centrum Adults oral tablet: 1 tab(s) orally once a day  Coumadin 3 mg oral tablet: 1 tab(s) orally once a day   dilTIAZem 240 mg/24 hours oral capsule, extended release: 1 cap(s) orally once a day  ferrous sulfate 325 mg (65 mg elemental iron) oral delayed release tablet: 1 tab(s) orally once a day  folic acid 0.8 mg oral tablet: 1 tab(s) orally once a day  Imdur 30 mg oral tablet, extended release: 1 tab(s) orally once a day (in the morning)  K-Dur 10: 1  orally 3 times a day  letrozole 2.5 mg oral tablet: 1 tab(s) orally once a day  Lopressor 50 mg oral tablet: 1 tab(s) orally 2 times a day  pravastatin 20 mg oral tablet: 1 tab(s) orally once a day (at bedtime)  torsemide 20 mg oral tablet: 2 tab(s) orally 2 times a day  Vitamin B-100 oral tablet: 1 tab(s) orally once a day  Vitamin D3 10,000 intl units (250 mcg) oral capsule: 1  orally once a day  Zoloft 100 mg oral tablet: 1 tab(s) orally once a day

## 2020-07-06 NOTE — DISCHARGE NOTE PROVIDER - CARE PROVIDER_API CALL
Kingston Guevara  INTERNAL MEDICINE  [address] [place] [address]  Phone: [phone]  Fax: [phone]  Follow Up Time: 1 week

## 2020-07-06 NOTE — DISCHARGE NOTE PROVIDER - HOSPITAL COURSE
87yo F hx breast CA, aortic stenosis s/p TAVR, afib on coumadin, htn, hld, asthma p/w generalized weakness, worsening AARON, and fever a/w hypoxia, hypotension, RODRIGUEZ and concern for sepsis of unclear source. 89yo F hx breast CA, aortic stenosis s/p TAVR, afib on coumadin, htn, hld, asthma p/w generalized weakness, worsening AARON, and fever a/w hypoxia, hypotension, RODRIGUEZ and concern for sepsis of unclear source. s/p ABx for suspected PNA. CXR and CTA negative for PNA. Negative for COVID. UA/UCx/BCx negative. Echo shows grossly hyperdynamic LV function, normal fnction, normal function TAVR without signs of IE. s/p IV Lasix with clinical improvement now stable on PO Torsemide. Patient remains stable for DC home with close follow up with PCP, Cards as per Dr. Crenshaw.

## 2020-07-07 ENCOUNTER — TRANSCRIPTION ENCOUNTER (OUTPATIENT)
Age: 85
End: 2020-07-07

## 2020-07-07 VITALS
RESPIRATION RATE: 18 BRPM | DIASTOLIC BLOOD PRESSURE: 71 MMHG | TEMPERATURE: 98 F | HEART RATE: 83 BPM | OXYGEN SATURATION: 96 % | SYSTOLIC BLOOD PRESSURE: 120 MMHG

## 2020-07-07 DIAGNOSIS — I48.91 UNSPECIFIED ATRIAL FIBRILLATION: ICD-10-CM

## 2020-07-07 LAB
ANION GAP SERPL CALC-SCNC: 12 MMOL/L — SIGNIFICANT CHANGE UP (ref 5–17)
BUN SERPL-MCNC: 32 MG/DL — HIGH (ref 7–23)
CALCIUM SERPL-MCNC: 9.4 MG/DL — SIGNIFICANT CHANGE UP (ref 8.4–10.5)
CHLORIDE SERPL-SCNC: 101 MMOL/L — SIGNIFICANT CHANGE UP (ref 96–108)
CO2 SERPL-SCNC: 28 MMOL/L — SIGNIFICANT CHANGE UP (ref 22–31)
CREAT SERPL-MCNC: 0.95 MG/DL — SIGNIFICANT CHANGE UP (ref 0.5–1.3)
GLUCOSE SERPL-MCNC: 115 MG/DL — HIGH (ref 70–99)
HCT VFR BLD CALC: 35.1 % — SIGNIFICANT CHANGE UP (ref 34.5–45)
HGB BLD-MCNC: 11.3 G/DL — LOW (ref 11.5–15.5)
INR BLD: 2.16 RATIO — HIGH (ref 0.88–1.16)
MCHC RBC-ENTMCNC: 30.5 PG — SIGNIFICANT CHANGE UP (ref 27–34)
MCHC RBC-ENTMCNC: 32.2 GM/DL — SIGNIFICANT CHANGE UP (ref 32–36)
MCV RBC AUTO: 94.6 FL — SIGNIFICANT CHANGE UP (ref 80–100)
NRBC # BLD: 0 /100 WBCS — SIGNIFICANT CHANGE UP (ref 0–0)
PLATELET # BLD AUTO: 151 K/UL — SIGNIFICANT CHANGE UP (ref 150–400)
POTASSIUM SERPL-MCNC: 3.7 MMOL/L — SIGNIFICANT CHANGE UP (ref 3.5–5.3)
POTASSIUM SERPL-SCNC: 3.7 MMOL/L — SIGNIFICANT CHANGE UP (ref 3.5–5.3)
PROTHROM AB SERPL-ACNC: 24.6 SEC — HIGH (ref 10.6–13.6)
RBC # BLD: 3.71 M/UL — LOW (ref 3.8–5.2)
RBC # FLD: 13.2 % — SIGNIFICANT CHANGE UP (ref 10.3–14.5)
SODIUM SERPL-SCNC: 141 MMOL/L — SIGNIFICANT CHANGE UP (ref 135–145)
WBC # BLD: 7.08 K/UL — SIGNIFICANT CHANGE UP (ref 3.8–10.5)
WBC # FLD AUTO: 7.08 K/UL — SIGNIFICANT CHANGE UP (ref 3.8–10.5)

## 2020-07-07 PROCEDURE — 85610 PROTHROMBIN TIME: CPT

## 2020-07-07 PROCEDURE — 87040 BLOOD CULTURE FOR BACTERIA: CPT

## 2020-07-07 PROCEDURE — 84480 ASSAY TRIIODOTHYRONINE (T3): CPT

## 2020-07-07 PROCEDURE — 85027 COMPLETE CBC AUTOMATED: CPT

## 2020-07-07 PROCEDURE — 84300 ASSAY OF URINE SODIUM: CPT

## 2020-07-07 PROCEDURE — 97116 GAIT TRAINING THERAPY: CPT

## 2020-07-07 PROCEDURE — 96375 TX/PRO/DX INJ NEW DRUG ADDON: CPT | Mod: XU

## 2020-07-07 PROCEDURE — 80053 COMPREHEN METABOLIC PANEL: CPT

## 2020-07-07 PROCEDURE — 96374 THER/PROPH/DIAG INJ IV PUSH: CPT | Mod: XU

## 2020-07-07 PROCEDURE — 86769 SARS-COV-2 COVID-19 ANTIBODY: CPT

## 2020-07-07 PROCEDURE — 84484 ASSAY OF TROPONIN QUANT: CPT

## 2020-07-07 PROCEDURE — 94640 AIRWAY INHALATION TREATMENT: CPT

## 2020-07-07 PROCEDURE — 71045 X-RAY EXAM CHEST 1 VIEW: CPT

## 2020-07-07 PROCEDURE — 93306 TTE W/DOPPLER COMPLETE: CPT

## 2020-07-07 PROCEDURE — 97161 PT EVAL LOW COMPLEX 20 MIN: CPT

## 2020-07-07 PROCEDURE — 84540 ASSAY OF URINE/UREA-N: CPT

## 2020-07-07 PROCEDURE — 87086 URINE CULTURE/COLONY COUNT: CPT

## 2020-07-07 PROCEDURE — 85730 THROMBOPLASTIN TIME PARTIAL: CPT

## 2020-07-07 PROCEDURE — 83735 ASSAY OF MAGNESIUM: CPT

## 2020-07-07 PROCEDURE — 83880 ASSAY OF NATRIURETIC PEPTIDE: CPT

## 2020-07-07 PROCEDURE — 84436 ASSAY OF TOTAL THYROXINE: CPT

## 2020-07-07 PROCEDURE — 93005 ELECTROCARDIOGRAM TRACING: CPT

## 2020-07-07 PROCEDURE — 99285 EMERGENCY DEPT VISIT HI MDM: CPT | Mod: 25

## 2020-07-07 PROCEDURE — 71275 CT ANGIOGRAPHY CHEST: CPT

## 2020-07-07 PROCEDURE — 99232 SBSQ HOSP IP/OBS MODERATE 35: CPT

## 2020-07-07 PROCEDURE — 82570 ASSAY OF URINE CREATININE: CPT

## 2020-07-07 PROCEDURE — 80048 BASIC METABOLIC PNL TOTAL CA: CPT

## 2020-07-07 PROCEDURE — 97530 THERAPEUTIC ACTIVITIES: CPT

## 2020-07-07 PROCEDURE — 84443 ASSAY THYROID STIM HORMONE: CPT

## 2020-07-07 PROCEDURE — 81001 URINALYSIS AUTO W/SCOPE: CPT

## 2020-07-07 RX ORDER — SPIRONOLACTONE 25 MG/1
0 TABLET, FILM COATED ORAL
Qty: 0 | Refills: 0 | DISCHARGE

## 2020-07-07 RX ORDER — WARFARIN SODIUM 2.5 MG/1
1 TABLET ORAL
Qty: 0 | Refills: 0 | DISCHARGE

## 2020-07-07 RX ORDER — DILTIAZEM HCL 120 MG
1 CAPSULE, EXT RELEASE 24 HR ORAL
Qty: 0 | Refills: 0 | DISCHARGE
Start: 2020-07-07

## 2020-07-07 RX ORDER — WARFARIN SODIUM 2.5 MG/1
1 TABLET ORAL
Qty: 30 | Refills: 0
Start: 2020-07-07 | End: 2020-08-05

## 2020-07-07 RX ORDER — POTASSIUM CHLORIDE 20 MEQ
40 PACKET (EA) ORAL ONCE
Refills: 0 | Status: COMPLETED | OUTPATIENT
Start: 2020-07-07 | End: 2020-07-07

## 2020-07-07 RX ADMIN — Medication 40 MILLIEQUIVALENT(S): at 11:45

## 2020-07-07 RX ADMIN — Medication 81 MILLIGRAM(S): at 11:44

## 2020-07-07 RX ADMIN — Medication 50 MILLIGRAM(S): at 06:14

## 2020-07-07 RX ADMIN — Medication 40 MILLIGRAM(S): at 06:14

## 2020-07-07 RX ADMIN — Medication 3 MILLILITER(S): at 11:44

## 2020-07-07 RX ADMIN — Medication 325 MILLIGRAM(S): at 11:44

## 2020-07-07 RX ADMIN — SERTRALINE 100 MILLIGRAM(S): 25 TABLET, FILM COATED ORAL at 11:44

## 2020-07-07 RX ADMIN — Medication 240 MILLIGRAM(S): at 06:14

## 2020-07-07 RX ADMIN — Medication 1 MILLIGRAM(S): at 11:44

## 2020-07-07 RX ADMIN — BUDESONIDE AND FORMOTEROL FUMARATE DIHYDRATE 2 PUFF(S): 160; 4.5 AEROSOL RESPIRATORY (INHALATION) at 06:14

## 2020-07-07 RX ADMIN — LETROZOLE 2.5 MILLIGRAM(S): 2.5 TABLET, FILM COATED ORAL at 11:44

## 2020-07-07 RX ADMIN — ISOSORBIDE MONONITRATE 30 MILLIGRAM(S): 60 TABLET, EXTENDED RELEASE ORAL at 11:44

## 2020-07-07 RX ADMIN — Medication 1 TABLET(S): at 11:45

## 2020-07-07 NOTE — PROGRESS NOTE ADULT - PROBLEM SELECTOR PROBLEM 2
Other specified hypotension

## 2020-07-07 NOTE — PROGRESS NOTE ADULT - REASON FOR ADMISSION
AARON and fever

## 2020-07-07 NOTE — PROGRESS NOTE ADULT - PROBLEM SELECTOR PLAN 8
with known abd soft tissue mass, increasing in size  - Has appointment to see Onc at [facility] [place] on Monday.
with known abd soft tissue mass, increasing in size  - Has appointment to see Onc at [facility] [place] on Monday.
with known abd soft tissue mass, increasing in size  - Has appointment to see Onc at [facility] [place] on Monday.
with known abd soft tissue mass, increasing in size  - Has appointment to see Onc at [facility] [place] on Monday.
with known abd soft tissue mass, increasing in size  - [place] appointment [place] [facility] [place]

## 2020-07-07 NOTE — PROGRESS NOTE ADULT - PROBLEM SELECTOR PLAN 5
s/p TAVR  - on diuretics (Torsemide 40mg po bid with Spironolactone) for unclear type of CHF  - will check TTE
s/p TAVR  - on diuretics (Torsemide 40mg po bid with Spironolactone) for unclear type of CHF  - echo with nl lvfx  subtherapeutic inr - lovenox + coumadin
s/p TAVR  - on diuretics (Torsemide 40mg po bid with Spironolactone) for unclear type of CHF  - echo with nl lvfx  cont ac
s/p TAVR  - on diuretics (Torsemide 40mg po bid with Spironolactone) for unclear type of CHF  - will check TTE
s/p TAVR  - on diuretics (Torsemide 40mg po bid with Spironolactone) for unclear type of CHF  - echo with nl lvfx  subtherapeutic inr - lovenox + coumadin

## 2020-07-07 NOTE — PROGRESS NOTE ADULT - PROBLEM SELECTOR PLAN 9
-already on coumadin with INR 4.34

## 2020-07-07 NOTE — PROGRESS NOTE ADULT - PROBLEM SELECTOR PLAN 3
with weakness, fever, hypoxia and hypotension, possible ATN/pre-renal or worsening CHF, creatinine 0.4 in Feb 2018, now 1.39  -
with weakness, fever, hypoxia and hypotension, possible ATN/pre-renal or worsening CHF, creatinine 0.4 in Feb 2018, now 1.39  -
stable
with weakness, fever, hypoxia and hypotension, possible ATN/pre-renal or worsening CHF, creatinine 0.4 in Feb 2018, now 1.39  -
with weakness, fever, hypoxia and hypotension, possible ATN/pre-renal or worsening CHF, creatinine 0.4 in Feb 2018, now 1.39  -

## 2020-07-07 NOTE — PROGRESS NOTE ADULT - PROBLEM SELECTOR PROBLEM 8
Metastatic breast cancer

## 2020-07-07 NOTE — DISCHARGE NOTE NURSING/CASE MANAGEMENT/SOCIAL WORK - NSDCFUADDAPPT_GEN_ALL_CORE_FT
Please follow up with your PCP, Dr. Guevara in 2-3 days to have your INR check (your INR was elevated to 4.34 upon admission) and also for further management

## 2020-07-07 NOTE — PROGRESS NOTE ADULT - SUBJECTIVE AND OBJECTIVE BOX
SUBJECTIVE / OVERNIGHT EVENTS: pt c/o fatigue    MEDICATIONS  (STANDING):  albuterol/ipratropium for Nebulization 3 milliLiter(s) Nebulizer every 6 hours  aspirin enteric coated 81 milliGRAM(s) Oral daily  atorvastatin 10 milliGRAM(s) Oral at bedtime  budesonide  80 MICROgram(s)/formoterol 4.5 MICROgram(s) Inhaler 2 Puff(s) Inhalation two times a day  diltiazem    milliGRAM(s) Oral daily  ferrous    sulfate 325 milliGRAM(s) Oral daily  folic acid 1 milliGRAM(s) Oral daily  isosorbide   mononitrate ER Tablet (IMDUR) 30 milliGRAM(s) Oral daily  letrozole 2.5 milliGRAM(s) Oral daily  metoprolol tartrate 50 milliGRAM(s) Oral two times a day  multivitamin/minerals 1 Tablet(s) Oral daily  sertraline 100 milliGRAM(s) Oral daily  torsemide 40 milliGRAM(s) Oral two times a day    MEDICATIONS  (PRN):    Vital Signs Last 24 Hrs  T(C): 36.9 (2020 20:21), Max: 36.9 (2020 04:46)  T(F): 98.5 (2020 20:21), Max: 98.5 (2020 04:46)  HR: 84 (2020 20:21) (78 - 86)  BP: 101/69 (2020 20:21) (101/69 - 133/81)  BP(mean): --  RR: 18 (2020 20:21) (16 - 18)  SpO2: 96% (2020 20:21) (96% - 97%)    Constitutional: No fever, fatigue  Skin: No rash.  Eyes: No recent vision problems or eye pain.  ENT: No congestion, ear pain, or sore throat.  Cardiovascular: No chest pain or palpation.  Respiratory: No cough, shortness of breath, congestion, or wheezing.  Gastrointestinal: No abdominal pain, nausea, vomiting, or diarrhea.  Genitourinary: No dysuria.  Musculoskeletal: No joint swelling.  Neurologic: No headache.    PHYSICAL EXAM:  GENERAL: NAD  EYES: EOMI, PERRLA  NECK: Supple, No JVD  CHEST/LUNG: dec breath sounds at bases   HEART:  S1 , S2 +  ABDOMEN: soft , bs+  EXTREMITIES:  trace edema  NEUROLOGY:alert awake    LABS:      136  |  100  |  42<H>  ----------------------------<  110<H>  3.8   |  27  |  1.02    Ca    9.6      2020 05:06  Mg     2.3           Creatinine Trend: 1.02 <--, 1.00 <--, 1.11 <--, 1.13 <--, 1.34 <--                        11.3   6.38  )-----------( 130      ( 2020 05:06 )             34.6     Urine Studies:  Urinalysis Basic - ( 2020 22:35 )    Color: Yellow / Appearance: Clear / S.026 / pH:   Gluc:  / Ketone: Negative  / Bili: Negative / Urobili: <2 mg/dL   Blood:  / Protein: Trace / Nitrite: Negative   Leuk Esterase: Large / RBC: 3 /HPF / WBC 35 /HPF   Sq Epi:  / Non Sq Epi: 4 /HPF / Bacteria: Negative      Sodium, Random Urine: <35 mmol/L ( @ 19:48)  Creatinine, Random Urine: 65 mg/dL ( @ 19:48)            PT/INR - ( 2020 08:22 )   PT: 22.6 sec;   INR: 1.98 ratio         PTT - ( 2020 08:22 )  PTT:40.1 sec   <2 mg/dL   Blood:  / Protein: Trace / Nitrite: Negative   Leuk Esterase: Large / RBC: 3 /HPF / WBC 35 /HPF   Sq Epi:  / Non Sq Epi: 4 /HPF / Bacteria: Negative      Sodium, Random Urine: <35 mmol/L ( @ 19:48)  Creatinine, Random Urine: 65 mg/dL ( @ 19:48)            PT/INR - ( 2020 08:28 )   PT: 21.6 sec;   INR: 1.89 ratio         PTT - ( 2020 08:28 )  PTT:34.5 sec
· Subjective and Objective:   [facility] CARDIOLOGY CONSULTANTS:    Camron Rivera, Phil, Roland, Alex Anthony, Gurpreet Cooney      [phone]    CHIEF COMPLAINT: Patient is a 88y old  Female who presents with a chief complaint of AARON and fever (2020 22:11)    Pt laying/sleeping flat this morning in bed in NAD.      ROS otherwise negative unless noted    TELEMETRY: AF 60-80      PAST MEDICAL & SURGICAL HISTORY:  Morbid obesity  Aortic stenosis: moderate  Atrial fibrillation  Spinal stenosis  HLD (hyperlipidemia)  HTN (hypertension)  Asthma  History of cataract surgery, left  Lipoma  Hx of hysterectomy      MEDICATIONS  (STANDING):  albuterol/ipratropium for Nebulization 3 milliLiter(s) Nebulizer every 6 hours  aspirin enteric coated 81 milliGRAM(s) Oral daily  atorvastatin 10 milliGRAM(s) Oral at bedtime  diltiazem    milliGRAM(s) Oral daily  ferrous    sulfate 325 milliGRAM(s) Oral daily  folic acid 1 milliGRAM(s) Oral daily  furosemide   Injectable 40 milliGRAM(s) IV Push two times a day  isosorbide   mononitrate ER Tablet (IMDUR) 30 milliGRAM(s) Oral daily  letrozole 2.5 milliGRAM(s) Oral daily  metoprolol tartrate 50 milliGRAM(s) Oral two times a day  multivitamin/minerals 1 Tablet(s) Oral daily  potassium chloride    Tablet ER 20 milliEquivalent(s) Oral once  sertraline 100 milliGRAM(s) Oral daily      Allergies    caffeine (Unknown)  menthol topical (Unknown)  Originally Entered as [Unknown] reaction to [MENTHOL] (Unknown)  sulfa (Unknown)  sulfa drugs (Unknown)    Intolerances                              11.9   4.78  )-----------( 116      ( 2020 05:30 )             37.5       07-04    137  |  98  |  38<H>  ----------------------------<  108<H>  3.9   |  29  |  1.11    Ca    9.3      2020 05:30    TPro  7.1  /  Alb  4.1  /  TBili  0.6  /  DBili  x   /  AST  16  /  ALT  9<L>  /  AlkPhos  58  07-02      LIVER FUNCTIONS - ( 2020 15:15 )  Alb: 4.1 g/dL / Pro: 7.1 g/dL / ALK PHOS: 58 U/L / ALT: 9 U/L / AST: 16 U/L / GGT: x             PT/INR - ( 2020 08:47 )   PT: 31.6 sec;   INR: 2.81 ratio         PTT - ( 2020 15:15 )  PTT:40.1 sec                      Daily     Daily Weight in k.6 (2020 08:19)    I&O's Summary    2020 07:01  -  2020 07:00  --------------------------------------------------------  IN: 240 mL / OUT: 500 mL / NET: -260 mL        Vital Signs Last 24 Hrs  T(C): 36.8 (2020 05:22), Max: 36.9 (2020 20:31)  T(F): 98.3 (2020 05:22), Max: 98.4 (2020 20:31)  HR: 68 (2020 05:22) (68 - 81)  BP: 122/73 (2020 05:22) (113/80 - 124/77)  BP(mean): --  RR: 18 (2020 07:05) (16 - 18)  SpO2: 96% (2020 07:05) (96% - 98%)    PHYSICAL EXAM:   · Constitutional	Well-developed, well nourished  · Eyes	EOMI; PERRL; no drainage or redness  · ENMT	No oral lesions; no gross abnormalities  · Neck	No bruits; no thyromegaly or nodules  · Respiratory	Normal breath sounds b/l, No RRW  · Cardiovascular	Regular rate & rhythm, normal S1, S2; no murmurs, gallops or rubs; no S3, S4  · Gastrointestinal	Soft, non-tender, no hepatosplenomegaly, normal bowel sounds  · Extremities	No cyanosis, clubbing or edema  · Vascular	Equal and normal pulses (carotid, femoral, dorsalis pedis)  · Neurological	Alert & oriented; no sensory, motor or coordination deficits, normal reflexes
[facility] - Camron Rivera, Phil, Roland, Raj, Ivet Johnson  Office Number:  [phone]    Patient resting comfortably in bed in NAD.  Laying flat with no respiratory distress.  No complaints of chest pain, dyspnea, palpitations, PND, or orthopnea.  No overnight events.    F/U for:  AF    Telemetry: Rate controlled AF     MEDICATIONS  (STANDING):  albuterol/ipratropium for Nebulization 3 milliLiter(s) Nebulizer every 6 hours  aspirin enteric coated 81 milliGRAM(s) Oral daily  atorvastatin 10 milliGRAM(s) Oral at bedtime  budesonide  80 MICROgram(s)/formoterol 4.5 MICROgram(s) Inhaler 2 Puff(s) Inhalation two times a day  diltiazem    milliGRAM(s) Oral daily  ferrous    sulfate 325 milliGRAM(s) Oral daily  folic acid 1 milliGRAM(s) Oral daily  isosorbide   mononitrate ER Tablet (IMDUR) 30 milliGRAM(s) Oral daily  letrozole 2.5 milliGRAM(s) Oral daily  metoprolol tartrate 50 milliGRAM(s) Oral two times a day  multivitamin/minerals 1 Tablet(s) Oral daily  sertraline 100 milliGRAM(s) Oral daily  torsemide 40 milliGRAM(s) Oral two times a day    MEDICATIONS  (PRN):      Allergies    caffeine (Unknown)  menthol topical (Unknown)  Originally Entered as [Unknown] reaction to [MENTHOL] (Unknown)  sulfa (Unknown)  sulfa drugs (Unknown)    Intolerances        Vital Signs Last 24 Hrs  T(C): 36.4 (07 Jul 2020 04:56), Max: 36.9 (06 Jul 2020 20:21)  T(F): 97.6 (07 Jul 2020 04:56), Max: 98.5 (06 Jul 2020 20:21)  HR: 92 (07 Jul 2020 04:56) (78 - 92)  BP: 139/84 (07 Jul 2020 04:56) (101/69 - 139/84)  BP(mean): --  RR: 16 (07 Jul 2020 04:56) (16 - 18)  SpO2: 97% (07 Jul 2020 04:56) (96% - 97%)    I&O's Summary    06 Jul 2020 07:01 - 07 Jul 2020 07:00  --------------------------------------------------------  IN: 840 mL / OUT: 1601 mL / NET: -761 mL        ON EXAM:    General: NAD, awake and alert, oriented x 3, on oxygen via nc  HEENT: Mucous membranes are moist, anicteric  Lungs: Non-labored, breath sounds are clear bilaterally, No wheezing, rales or rhonchi  Cardiovascular: irregular and tachy, S1 and S2, no murmurs, rubs, or gallops  Gastrointestinal: Bowel Sounds present, soft, nontender.   Lymph: No peripheral edema, chronic venous changes. No lymphadenopathy.  Skin: No rashes or ulcers  Psych:  Mood & affect appropriate      LABS: All Labs Reviewed:                        11.3   7.08  )-----------( 151      ( 07 Jul 2020 05:16 )             35.1                         11.3   6.38  )-----------( 130      ( 06 Jul 2020 05:06 )             34.6                         11.3   5.40  )-----------( 129      ( 05 Jul 2020 06:09 )             35.1     07 Jul 2020 05:16    141    |  101    |  32     ----------------------------<  115    3.7     |  28     |  0.95   06 Jul 2020 05:06    136    |  100    |  42     ----------------------------<  110    3.8     |  27     |  1.02   05 Jul 2020 06:09    137    |  100    |  41     ----------------------------<  115    3.9     |  28     |  1.00     Ca    9.4        07 Jul 2020 05:16  Ca    9.6        06 Jul 2020 05:06  Ca    9.4        05 Jul 2020 06:09  Mg     2.3       06 Jul 2020 05:06  Mg     2.4       05 Jul 2020 06:09      PT/INR - ( 06 Jul 2020 08:22 )   PT: 22.6 sec;   INR: 1.98 ratio         PTT - ( 06 Jul 2020 08:22 )  PTT:40.1 sec      Blood Culture: Organism --  Gram Stain Blood -- Gram Stain --  Specimen Source .Blood Blood-Peripheral  Culture-Blood --    Organism --  Gram Stain Blood -- Gram Stain --  Specimen Source .Urine Clean Catch (Midstream)  Culture-Blood --    Organism --  Gram Stain Blood -- Gram Stain --  Specimen Source .Blood Blood-Peripheral  Culture-Blood --
SUBJECTIVE / OVERNIGHT EVENTS: pt c/o fatigue    MEDICATIONS  (STANDING):  albuterol/ipratropium for Nebulization 3 milliLiter(s) Nebulizer every 6 hours  aspirin enteric coated 81 milliGRAM(s) Oral daily  atorvastatin 10 milliGRAM(s) Oral at bedtime  budesonide  80 MICROgram(s)/formoterol 4.5 MICROgram(s) Inhaler 2 Puff(s) Inhalation two times a day  diltiazem    milliGRAM(s) Oral daily  ferrous    sulfate 325 milliGRAM(s) Oral daily  folic acid 1 milliGRAM(s) Oral daily  isosorbide   mononitrate ER Tablet (IMDUR) 30 milliGRAM(s) Oral daily  letrozole 2.5 milliGRAM(s) Oral daily  metoprolol tartrate 50 milliGRAM(s) Oral two times a day  multivitamin/minerals 1 Tablet(s) Oral daily  sertraline 100 milliGRAM(s) Oral daily  torsemide 40 milliGRAM(s) Oral two times a day    MEDICATIONS  (PRN):    Vital Signs Last 24 Hrs  T(C): 36.4 (2020 04:56), Max: 36.9 (2020 20:21)  T(F): 97.6 (2020 04:56), Max: 98.5 (2020 20:21)  HR: 92 (2020 04:56) (79 - 92)  BP: 139/84 (2020 04:56) (101/69 - 139/84)  BP(mean): --  RR: 18 (2020 09:26) (16 - 18)  SpO2: 93% (2020 09:26) (93% - 97%)    Constitutional: No fever, fatigue  Skin: No rash.  Eyes: No recent vision problems or eye pain.  ENT: No congestion, ear pain, or sore throat.  Cardiovascular: No chest pain or palpation.  Respiratory: No cough, shortness of breath, congestion, or wheezing.  Gastrointestinal: No abdominal pain, nausea, vomiting, or diarrhea.  Genitourinary: No dysuria.  Musculoskeletal: No joint swelling.  Neurologic: No headache.    PHYSICAL EXAM:  GENERAL: NAD  EYES: EOMI, PERRLA  NECK: Supple, No JVD  CHEST/LUNG: dec breath sounds at bases   HEART:  S1 , S2 +  ABDOMEN: soft , bs+  EXTREMITIES:  trace edema  NEUROLOGY:alert awake    LABS:      141  |  101  |  32<H>  ----------------------------<  115<H>  3.7   |  28  |  0.95    Ca    9.4      2020 05:16  Mg     2.3           Creatinine Trend: 0.95 <--, 1.02 <--, 1.00 <--, 1.11 <--, 1.13 <--, 1.34 <--                        11.3   7.08  )-----------( 151      ( 2020 05:16 )             35.1     Urine Studies:  Urinalysis Basic - ( 2020 22:35 )    Color: Yellow / Appearance: Clear / S.026 / pH:   Gluc:  / Ketone: Negative  / Bili: Negative / Urobili: <2 mg/dL   Blood:  / Protein: Trace / Nitrite: Negative   Leuk Esterase: Large / RBC: 3 /HPF / WBC 35 /HPF   Sq Epi:  / Non Sq Epi: 4 /HPF / Bacteria: Negative      Sodium, Random Urine: <35 mmol/L ( @ 19:48)  Creatinine, Random Urine: 65 mg/dL ( @ 19:48)            PT/INR - ( 2020 08:23 )   PT: 24.6 sec;   INR: 2.16 ratio         PTT - ( 2020 08:22 )  PTT:40.1 sec
[facility] - Camron Rivera, Phil, Roland, Raj, Ivet Johnson  Office Number:  [phone]    Patient resting comfortably in bed in NAD.  Laying flat with no respiratory distress.  No complaints of chest pain, dyspnea, palpitations, PND, or orthopnea. on oxygen via nc    ROS: negative unless otherwise mentioned.    Telemetry:  af     MEDICATIONS  (STANDING):  albuterol/ipratropium for Nebulization 3 milliLiter(s) Nebulizer every 6 hours  aspirin enteric coated 81 milliGRAM(s) Oral daily  atorvastatin 10 milliGRAM(s) Oral at bedtime  diltiazem    milliGRAM(s) Oral daily  enoxaparin Injectable 90 milliGRAM(s) SubCutaneous two times a day  ferrous    sulfate 325 milliGRAM(s) Oral daily  folic acid 1 milliGRAM(s) Oral daily  furosemide   Injectable 40 milliGRAM(s) IV Push two times a day  isosorbide   mononitrate ER Tablet (IMDUR) 30 milliGRAM(s) Oral daily  letrozole 2.5 milliGRAM(s) Oral daily  metoprolol tartrate 50 milliGRAM(s) Oral two times a day  multivitamin/minerals 1 Tablet(s) Oral daily  sertraline 100 milliGRAM(s) Oral daily    MEDICATIONS  (PRN):      Allergies    caffeine (Unknown)  menthol topical (Unknown)  Originally Entered as [Unknown] reaction to [MENTHOL] (Unknown)  sulfa (Unknown)  sulfa drugs (Unknown)    Intolerances        Vital Signs Last 24 Hrs  T(C): 36.9 (06 Jul 2020 04:46), Max: 36.9 (06 Jul 2020 04:46)  T(F): 98.5 (06 Jul 2020 04:46), Max: 98.5 (06 Jul 2020 04:46)  HR: 86 (06 Jul 2020 04:46) (83 - 89)  BP: 123/75 (06 Jul 2020 04:46) (109/70 - 123/75)  BP(mean): --  RR: 16 (06 Jul 2020 04:46) (16 - 18)  SpO2: 97% (06 Jul 2020 04:46) (90% - 97%)    I&O's Summary    05 Jul 2020 07:01  -  06 Jul 2020 07:00  --------------------------------------------------------  IN: 720 mL / OUT: 1500 mL / NET: -780 mL        ON EXAM:    General: NAD, awake and alert, oriented x 3, on oxygen via nc  HEENT: Mucous membranes are moist, anicteric  Lungs: Non-labored, breath sounds are clear bilaterally, No wheezing, rales or rhonchi  Cardiovascular: irregular and tachy, S1 and S2, no murmurs, rubs, or gallops  Gastrointestinal: Bowel Sounds present, soft, nontender.   Lymph: No peripheral edema, chronic venous changes. No lymphadenopathy.  Skin: No rashes or ulcers  Psych:  Mood & affect appropriate    LABS: All Labs Reviewed:                        11.3   6.38  )-----------( 130      ( 06 Jul 2020 05:06 )             34.6                         11.3   5.40  )-----------( 129      ( 05 Jul 2020 06:09 )             35.1                         11.9   4.78  )-----------( 116      ( 04 Jul 2020 05:30 )             37.5     06 Jul 2020 05:06    136    |  100    |  42     ----------------------------<  110    3.8     |  27     |  1.02   05 Jul 2020 06:09    137    |  100    |  41     ----------------------------<  115    3.9     |  28     |  1.00   04 Jul 2020 05:30    137    |  98     |  38     ----------------------------<  108    3.9     |  29     |  1.11     Ca    9.6        06 Jul 2020 05:06  Ca    9.4        05 Jul 2020 06:09  Ca    9.3        04 Jul 2020 05:30  Mg     2.3       06 Jul 2020 05:06  Mg     2.4       05 Jul 2020 06:09      PT/INR - ( 06 Jul 2020 08:22 )   PT: 22.6 sec;   INR: 1.98 ratio         PTT - ( 06 Jul 2020 08:22 )  PTT:40.1 sec      Blood Culture: Organism --  Gram Stain Blood -- Gram Stain --  Specimen Source .Blood Blood-Peripheral  Culture-Blood --    Organism --  Gram Stain Blood -- Gram Stain --  Specimen Source .Urine Clean Catch (Midstream)  Culture-Blood --    Organism --  Gram Stain Blood -- Gram Stain --  Specimen Source .Blood Blood-Peripheral  Culture-Blood --
· Subjective and Objective:   [facility] CARDIOLOGY CONSULTANTS:    Camron Rivera, Phil, Roland, Alex Anthony, Gurpreet Cooney      [phone]    CHIEF COMPLAINT: Patient is a 88y old  Female who presents with a chief complaint of AARON and fever (02 Jul 2020 22:11)    Pt laying/sleeping flat this morning in bed in NAD.      ROS otherwise negative unless noted    TELEMETRY: AF 60-80        PAST MEDICAL & SURGICAL HISTORY:  Morbid obesity  Aortic stenosis: moderate  Atrial fibrillation  Spinal stenosis  HLD (hyperlipidemia)  HTN (hypertension)  Asthma  History of cataract surgery, left  Lipoma  Hx of hysterectomy      MEDICATIONS  (STANDING):  albuterol/ipratropium for Nebulization 3 milliLiter(s) Nebulizer every 6 hours  aspirin enteric coated 81 milliGRAM(s) Oral daily  atorvastatin 10 milliGRAM(s) Oral at bedtime  diltiazem    milliGRAM(s) Oral daily  ferrous    sulfate 325 milliGRAM(s) Oral daily  folic acid 1 milliGRAM(s) Oral daily  furosemide   Injectable 40 milliGRAM(s) IV Push two times a day  isosorbide   mononitrate ER Tablet (IMDUR) 30 milliGRAM(s) Oral daily  letrozole 2.5 milliGRAM(s) Oral daily  metoprolol tartrate 50 milliGRAM(s) Oral two times a day  multivitamin/minerals 1 Tablet(s) Oral daily  potassium chloride    Tablet ER 20 milliEquivalent(s) Oral once  sertraline 100 milliGRAM(s) Oral daily  warfarin 5 milliGRAM(s) Oral once      Allergies    caffeine (Unknown)  menthol topical (Unknown)  Originally Entered as [Unknown] reaction to [MENTHOL] (Unknown)  sulfa (Unknown)  sulfa drugs (Unknown)    Intolerances                              11.3   5.40  )-----------( 129      ( 05 Jul 2020 06:09 )             35.1       07-05    137  |  100  |  41<H>  ----------------------------<  115<H>  3.9   |  28  |  1.00    Ca    9.4      05 Jul 2020 06:09  Mg     2.4     07-05            PT/INR - ( 05 Jul 2020 08:28 )   PT: 21.6 sec;   INR: 1.89 ratio         PTT - ( 05 Jul 2020 08:28 )  PTT:34.5 sec                      Daily     Daily     I&O's Summary    04 Jul 2020 07:01  -  05 Jul 2020 07:00  --------------------------------------------------------  IN: 840 mL / OUT: 800 mL / NET: 40 mL        Vital Signs Last 24 Hrs  T(C): 36.6 (05 Jul 2020 05:46), Max: 37.1 (04 Jul 2020 18:05)  T(F): 97.8 (05 Jul 2020 05:46), Max: 98.7 (04 Jul 2020 18:05)  HR: 83 (05 Jul 2020 05:46) (70 - 83)  BP: 118/76 (05 Jul 2020 05:46) (101/58 - 131/76)  BP(mean): --  RR: 16 (05 Jul 2020 05:46) (16 - 18)  SpO2: 93% (05 Jul 2020 05:46) (92% - 100%)    PHYSICAL EXAM:   · Constitutional	Well-developed, well nourished  · Eyes	EOMI; PERRL; no drainage or redness  · ENMT	No oral lesions; no gross abnormalities  · Neck	No bruits; no thyromegaly or nodules  · Respiratory	Normal breath sounds b/l, No RRW  · Cardiovascular	Regular rate & rhythm, normal S1, S2; no murmurs, gallops or rubs; no S3, S4  · Gastrointestinal	Soft, non-tender, no hepatosplenomegaly, normal bowel sounds  · Extremities	No cyanosis, clubbing or edema  · Vascular	Equal and normal pulses (carotid, femoral, dorsalis pedis)  · Neurological	Alert & oriented; no sensory, motor or coordination deficits, normal reflexes
SUBJECTIVE / OVERNIGHT EVENTS: pt c/o fatigue    MEDICATIONS  (STANDING):  albuterol/ipratropium for Nebulization 3 milliLiter(s) Nebulizer every 6 hours  aspirin enteric coated 81 milliGRAM(s) Oral daily  atorvastatin 10 milliGRAM(s) Oral at bedtime  diltiazem    milliGRAM(s) Oral daily  ferrous    sulfate 325 milliGRAM(s) Oral daily  folic acid 1 milliGRAM(s) Oral daily  furosemide   Injectable 40 milliGRAM(s) IV Push two times a day  isosorbide   mononitrate ER Tablet (IMDUR) 30 milliGRAM(s) Oral daily  letrozole 2.5 milliGRAM(s) Oral daily  metoprolol tartrate 50 milliGRAM(s) Oral two times a day  multivitamin/minerals 1 Tablet(s) Oral daily  sertraline 100 milliGRAM(s) Oral daily    MEDICATIONS  (PRN):    Vital Signs Last 24 Hrs  T(C): 36.9 (03 Jul 2020 20:31), Max: 36.9 (03 Jul 2020 04:10)  T(F): 98.4 (03 Jul 2020 20:31), Max: 98.5 (03 Jul 2020 04:10)  HR: 70 (03 Jul 2020 20:31) (70 - 81)  BP: 113/80 (03 Jul 2020 20:31) (112/70 - 124/77)  BP(mean): --  RR: 18 (03 Jul 2020 20:31) (18 - 18)  SpO2: 97% (03 Jul 2020 20:31) (97% - 99%)    Constitutional: No fever, fatigue  Skin: No rash.  Eyes: No recent vision problems or eye pain.  ENT: No congestion, ear pain, or sore throat.  Cardiovascular: No chest pain or palpation.  Respiratory: No cough, shortness of breath, congestion, or wheezing.  Gastrointestinal: No abdominal pain, nausea, vomiting, or diarrhea.  Genitourinary: No dysuria.  Musculoskeletal: No joint swelling.  Neurologic: No headache.    PHYSICAL EXAM:  GENERAL: NAD  EYES: EOMI, PERRLA  NECK: Supple, No JVD  CHEST/LUNG: dec breath sounds at bases   HEART:  S1 , S2 +  ABDOMEN: soft , bs+  EXTREMITIES:  trace edema  NEUROLOGY:alert awake    LABS:  07-03    138  |  98  |  34<H>  ----------------------------<  101<H>  3.8   |  28  |  1.13    Ca    9.2      03 Jul 2020 06:45    TPro  7.1  /  Alb  4.1  /  TBili  0.6  /  DBili      /  AST  16  /  ALT  9<L>  /  AlkPhos  58  07-02    Creatinine Trend: 1.13 <--, 1.34 <--                        12.3   5.67  )-----------( 115      ( 03 Jul 2020 06:45 )             38.9     Urine Studies:    Sodium, Random Urine: <35 mmol/L (07-03 @ 19:48)  Creatinine, Random Urine: 65 mg/dL (07-03 @ 19:48)          LIVER FUNCTIONS - ( 02 Jul 2020 15:15 )  Alb: 4.1 g/dL / Pro: 7.1 g/dL / ALK PHOS: 58 U/L / ALT: 9 U/L / AST: 16 U/L / GGT: x           PT/INR - ( 03 Jul 2020 08:47 )   PT: 31.6 sec;   INR: 2.81 ratio         PTT - ( 02 Jul 2020 15:15 )  PTT:40.1 sec    RADIOLOGY & ADDITIONAL TESTS:    Imaging Personally Reviewed:    Consultant(s) Notes Reviewed:      Care Discussed with Consultants/Other Providers:
SUBJECTIVE / OVERNIGHT EVENTS: pt c/o fatigue    MEDICATIONS  (STANDING):  albuterol/ipratropium for Nebulization 3 milliLiter(s) Nebulizer every 6 hours  aspirin enteric coated 81 milliGRAM(s) Oral daily  atorvastatin 10 milliGRAM(s) Oral at bedtime  diltiazem    milliGRAM(s) Oral daily  ferrous    sulfate 325 milliGRAM(s) Oral daily  folic acid 1 milliGRAM(s) Oral daily  furosemide   Injectable 40 milliGRAM(s) IV Push two times a day  isosorbide   mononitrate ER Tablet (IMDUR) 30 milliGRAM(s) Oral daily  letrozole 2.5 milliGRAM(s) Oral daily  metoprolol tartrate 50 milliGRAM(s) Oral two times a day  multivitamin/minerals 1 Tablet(s) Oral daily  sertraline 100 milliGRAM(s) Oral daily    MEDICATIONS  (PRN):    Vital Signs Last 24 Hrs  T(C): 36.9 (2020 21:23), Max: 37.1 (2020 18:05)  T(F): 98.5 (2020 21:23), Max: 98.7 (2020 18:05)  HR: 76 (2020 21:23) (68 - 81)  BP: 101/58 (2020 21:23) (101/58 - 131/76)  BP(mean): --  RR: 18 (2020 21:23) (16 - 18)  SpO2: 92% (2020 21:23) (92% - 100%)    Constitutional: No fever, fatigue  Skin: No rash.  Eyes: No recent vision problems or eye pain.  ENT: No congestion, ear pain, or sore throat.  Cardiovascular: No chest pain or palpation.  Respiratory: No cough, shortness of breath, congestion, or wheezing.  Gastrointestinal: No abdominal pain, nausea, vomiting, or diarrhea.  Genitourinary: No dysuria.  Musculoskeletal: No joint swelling.  Neurologic: No headache.    PHYSICAL EXAM:  GENERAL: NAD  EYES: EOMI, PERRLA  NECK: Supple, No JVD  CHEST/LUNG: dec breath sounds at bases   HEART:  S1 , S2 +  ABDOMEN: soft , bs+  EXTREMITIES:  trace edema  NEUROLOGY:alert awake    LABS:      137  |  98  |  38<H>  ----------------------------<  108<H>  3.9   |  29  |  1.11    Ca    9.3      2020 05:30      Creatinine Trend: 1.11 <--, 1.13 <--, 1.34 <--                        11.9   4.78  )-----------( 116      ( 2020 05:30 )             37.5     Urine Studies:  Urinalysis Basic - ( 2020 22:35 )    Color: Yellow / Appearance: Clear / S.026 / pH:   Gluc:  / Ketone: Negative  / Bili: Negative / Urobili: <2 mg/dL   Blood:  / Protein: Trace / Nitrite: Negative   Leuk Esterase: Large / RBC: 3 /HPF / WBC 35 /HPF   Sq Epi:  / Non Sq Epi: 4 /HPF / Bacteria: Negative      Sodium, Random Urine: <35 mmol/L ( @ 19:48)  Creatinine, Random Urine: 65 mg/dL ( @ 19:48)            PT/INR - ( 2020 08:55 )   PT: 25.6 sec;   INR: 2.24 ratio
SUBJECTIVE / OVERNIGHT EVENTS: pt c/o fatigue    MEDICATIONS  (STANDING):  albuterol/ipratropium for Nebulization 3 milliLiter(s) Nebulizer every 6 hours  aspirin enteric coated 81 milliGRAM(s) Oral daily  atorvastatin 10 milliGRAM(s) Oral at bedtime  diltiazem    milliGRAM(s) Oral daily  enoxaparin Injectable 90 milliGRAM(s) SubCutaneous two times a day  ferrous    sulfate 325 milliGRAM(s) Oral daily  folic acid 1 milliGRAM(s) Oral daily  furosemide   Injectable 40 milliGRAM(s) IV Push two times a day  isosorbide   mononitrate ER Tablet (IMDUR) 30 milliGRAM(s) Oral daily  letrozole 2.5 milliGRAM(s) Oral daily  metoprolol tartrate 50 milliGRAM(s) Oral two times a day  multivitamin/minerals 1 Tablet(s) Oral daily  sertraline 100 milliGRAM(s) Oral daily  warfarin 5 milliGRAM(s) Oral once    MEDICATIONS  (PRN):    Vital Signs Last 24 Hrs  T(C): 36.8 (2020 20:15), Max: 36.8 (2020 20:15)  T(F): 98.3 (2020 20:15), Max: 98.3 (2020 20:15)  HR: 86 (2020 20:15) (83 - 89)  BP: 109/70 (2020 20:15) (109/70 - 120/66)  BP(mean): --  RR: 18 (2020 20:15) (16 - 18)  SpO2: 96% (2020 20:15) (90% - 96%)    Constitutional: No fever, fatigue  Skin: No rash.  Eyes: No recent vision problems or eye pain.  ENT: No congestion, ear pain, or sore throat.  Cardiovascular: No chest pain or palpation.  Respiratory: No cough, shortness of breath, congestion, or wheezing.  Gastrointestinal: No abdominal pain, nausea, vomiting, or diarrhea.  Genitourinary: No dysuria.  Musculoskeletal: No joint swelling.  Neurologic: No headache.    PHYSICAL EXAM:  GENERAL: NAD  EYES: EOMI, PERRLA  NECK: Supple, No JVD  CHEST/LUNG: dec breath sounds at bases   HEART:  S1 , S2 +  ABDOMEN: soft , bs+  EXTREMITIES:  trace edema  NEUROLOGY:alert awake    LABS:  07-05    137  |  100  |  41<H>  ----------------------------<  115<H>  3.9   |  28  |  1.00    Ca    9.4      2020 06:09  Mg     2.4           Creatinine Trend: 1.00 <--, 1.11 <--, 1.13 <--, 1.34 <--                        11.3   5.40  )-----------( 129      ( 2020 06:09 )             35.1     Urine Studies:  Urinalysis Basic - ( 2020 22:35 )    Color: Yellow / Appearance: Clear / S.026 / pH:   Gluc:  / Ketone: Negative  / Bili: Negative / Urobili: <2 mg/dL   Blood:  / Protein: Trace / Nitrite: Negative   Leuk Esterase: Large / RBC: 3 /HPF / WBC 35 /HPF   Sq Epi:  / Non Sq Epi: 4 /HPF / Bacteria: Negative      Sodium, Random Urine: <35 mmol/L ( @ 19:48)  Creatinine, Random Urine: 65 mg/dL ( @ 19:48)            PT/INR - ( 2020 08:28 )   PT: 21.6 sec;   INR: 1.89 ratio         PTT - ( 2020 08:28 )  PTT:34.5 sec

## 2020-07-07 NOTE — PROGRESS NOTE ADULT - PROBLEM SELECTOR PLAN 4
- will rate control with Metoprolol and Cardizem  - cont ac

## 2020-07-07 NOTE — PROGRESS NOTE ADULT - PROVIDER SPECIALTY LIST ADULT
Cardiology
Internal Medicine
Cardiology
Internal Medicine

## 2020-07-07 NOTE — PROGRESS NOTE ADULT - PROBLEM SELECTOR PLAN 7
- will check TFT and outpatient Endo f/u discussed with patient

## 2020-07-07 NOTE — PROGRESS NOTE ADULT - PROBLEM SELECTOR PLAN 1
88% RA in ED, currently 98% on 2L, Hx mild intermittent asthma, and baseline AARON with 10 steps, but worsen recently to AARON at rest/minimal exertion  - s/p Ctx and Zithro in ED for suspected PNA with fever and hypoxia, but CXR and CTA neg of PNA, COVID negative x1, no known sick contact, UA/UCx/BCx pending but asymptomatic.  Will consider ID in am, and will hold Abx for now  -diuresis  cards / pulm f/u
88% RA in ED, currently 98% on 2L, Hx mild intermittent asthma, and baseline AARON with 10 steps, but worsen recently to AARON at rest/minimal exertion  - s/p Ctx and Zithro in ED for suspected PNA with fever and hypoxia, but CXR and CTA neg of PNA, COVID negative x1, no known sick contact, UA/UCx/BCx pending but asymptomatic.  Will consider ID in am, and will hold Abx for now  -diuresis  cards / pulm f/u
improved oxygen sat with diuresis
88% RA in ED, currently 98% on 2L, Hx mild intermittent asthma, and baseline AARON with 10 steps, but worsen recently to AARON at rest/minimal exertion  - s/p Ctx and Zithro in ED for suspected PNA with fever and hypoxia, but CXR and CTA neg of PNA, COVID negative x1, no known sick contact, UA/UCx/BCx pending but asymptomatic.  Will consider ID in am, and will hold Abx for now  -diuresis  cards / pulm f/u
88% RA in ED, currently 98% on 2L, Hx mild intermittent asthma, and baseline AARON with 10 steps, but worsen recently to AARON at rest/minimal exertion  - s/p Ctx and Zithro in ED for suspected PNA with fever and hypoxia, but CXR and CTA neg of PNA, COVID negative x1, no known sick contact, UA/UCx/BCx pending but asymptomatic.  Will consider ID in am, and will hold Abx for now  -diuresis  cards / pulm f/u

## 2020-07-07 NOTE — DISCHARGE NOTE NURSING/CASE MANAGEMENT/SOCIAL WORK - PATIENT PORTAL LINK FT
You can access the FollowMyHealth Patient Portal offered by [facility] by registering at the following website: [email or web address]. By joining [facility]’s FollowMyHealth portal, you will also be able to view your health information using other applications (apps) compatible with our system.

## 2020-07-07 NOTE — PROGRESS NOTE ADULT - PROBLEM SELECTOR PROBLEM 4
Longstanding persistent atrial fibrillation

## 2020-07-07 NOTE — PROGRESS NOTE ADULT - ASSESSMENT
87yo F PMH AS s/p TAVR 12/2017 at [facility], unknown LV function, AF on coumadin, htn, hld, L breast CA on Letrozole, asthma p/w generalized weakness, worsening AARON, and fever tmax 102 x 3 days.  Unclear etiology for infection    - Currently no s/s cardiac ischemia  - c/w lasix 40mg IV bid - consider switching to po tomorrow, strict I&Os, daily weights  - TTE with grossly hyperdynamic LV function, normal function TAVR without signs of IE  - bld cultures negative    TAVR  - c/w ASA  - normal function on TTE    AF  - rate well controlled now  - c/w tele  - c/w dilt and metoprolol for rate control  - AC with coumadin    HTN  - c/w BB, CCB, imdur  - monitor BP closely in the setting of infection    HLD  - c/w statin    monitor electrolytes - keep K>4, Mg>2  will follow along with you
89yo F PMH AS s/p TAVR 12/2017 at [facility], unknown LV function, AF on coumadin, htn, hld, L breast CA on Letrozole, asthma p/w generalized weakness, worsening AARON, and fever tmax 102 x 3 days.  Unclear etiology for infection    - Currently no s/s cardiac ischemia  - on lasix 40mg IV bid  - breathing better today, can change diuretics back to her home dose of Torsemide 40 bid  - TTE with grossly hyperdynamic LV function, normal function TAVR without signs of IE  - blood cultures negative    TAVR  - c/w ASA  - normal function on TTE    AF  - rates have been labil  - c/w tele  - c/w diltizem  - can increase metoprolol to 75 bid if rates remain high  - AC with coumadin    HTN  - c/w BB, CCB, imdur  - monitor BP closely in the setting of infection    HLD  - c/w statin    monitor electrolytes - keep K>4, Mg>2  will follow along with you. d/c planning per primary team
89yo F hx breast CA, aortic stenosis s/p TAVR, afib on coumadin, htn, hld, asthma p/w generalized weakness, worsening AARON, and fever a/w hypoxia, hypotension, RODRIGUEZ and concern for sepsis of unclear source.
87yo F PMH AS s/p TAVR 12/2017 at [facility], unknown LV function, AF on coumadin, htn, hld, L breast CA on Letrozole, asthma p/w generalized weakness, worsening AARON, and fever tmax 102 x 3 days.  Unclear etiology for infection    - Currently no s/s cardiac ischemia  - IV Lasix changed to torsemide 40 bid  - breathing improved  - TTE with grossly hyperdynamic LV function, normal function TAVR without signs of IE  - blood cultures negative    TAVR  - c/w ASA  - normal function on TTE    AF  - rates have been controlled  - c/w tele  - c/w diltizem  - can increase metoprolol to 75 bid if rates remain high.  For now they are acceptable on telemetry  - AC with coumadin    HTN  - c/w BB, CCB, imdur  - monitor BP closely in the setting of infection    HLD  - c/w statin    monitor electrolytes - keep K>4, Mg>2  will follow along with you. d/c planning per primary team
89yo F hx breast CA, aortic stenosis s/p TAVR, afib on coumadin, htn, hld, asthma p/w generalized weakness, worsening AARON, and fever a/w hypoxia, hypotension, RODRIGUEZ and concern for sepsis of unclear source.
87yo F PMH AS s/p TAVR 12/2017 at [facility], unknown LV function, AF on coumadin, htn, hld, L breast CA on Letrozole, asthma p/w generalized weakness, worsening AARON, and fever tmax 102 x 3 days.  Unclear etiology for infection    - Currently no s/s cardiac ischemia  - c/w lasix 40mg IV bid, strict I&Os, daily weights  - check TTE  - check bld cultures    TAVR  - c/w ASA  - check TTE to assess LV function and evaluate valve and for signs of IE  - bld cx pending    AF  - rate well controlled now  - c/w tele  - c/w dilt and metoprolol for rate control  - AC with coumadin    HTN  - c/w BB, CCB, imdur  - monitor BP closely in the setting of infection    HLD  - c/w statin    monitor electrolytes - keep K>4, Mg>2  will follow along with you
89yo F hx breast CA, aortic stenosis s/p TAVR, afib on coumadin, htn, hld, asthma p/w generalized weakness, worsening AARON, and fever a/w hypoxia, hypotension, RODRIGUEZ and concern for sepsis of unclear source.

## 2020-07-08 LAB
CULTURE RESULTS: SIGNIFICANT CHANGE UP
CULTURE RESULTS: SIGNIFICANT CHANGE UP
SPECIMEN SOURCE: SIGNIFICANT CHANGE UP
SPECIMEN SOURCE: SIGNIFICANT CHANGE UP

## 2020-07-09 LAB
CULTURE RESULTS: SIGNIFICANT CHANGE UP
SPECIMEN SOURCE: SIGNIFICANT CHANGE UP

## 2021-04-21 NOTE — ED ADULT TRIAGE NOTE - ADDITIONAL COMPLAINTS
[FreeTextEntry1] : ANH is a 9 year old M with a left thumb SH2 proximal phalanx fracture sustained on 3/27.\par \par The condition, natural history, and prognosis were explained to the patient and family. Today's visit included obtaining the history from the child and parent, due to the child's age, the child could not be considered a reliable historian, requiring the parent to act as an independent historian. The clinical findings and images were reviewed with the family. \par \par The fracture has healed uneventfully. He may discontinue use of the splint. He may return to all activities as tolerated and should work on ROM of the finger. \par \par I am happy to see ANH if there are any concerns or anytime a problem arises in the future. \par \par All questions were answered, the family expresses understanding and agrees with the plan of care.  Additional Complaints

## 2021-05-26 NOTE — ED PROCEDURE NOTE - GENERAL PROCEDURE NAME
Per Ed in CT patient does not need pre/post hydration as patient is going to dialysis right after. Total time spent with patient was 15 minutes.    Ace wrap of left knee and left arm.

## 2021-06-03 NOTE — PROGRESS NOTE ADULT - PROBLEM SELECTOR PROBLEM 3
RODRIGUEZ (acute kidney injury)
Oriented - self; Oriented - place; Oriented - time

## 2022-03-03 NOTE — CHART NOTE - NSCHARTNOTEFT_GEN_A_CORE
Hospitalist Medicine PA    The patient's oxygen saturation is 88% on RA while at rest on 2/8/2018 Standing/Walking/Toileting

## 2022-04-08 NOTE — ED PROCEDURE NOTE - CPROC ED POST PROC CARE GUIDE1
Instructed patient/caregiver to follow-up with primary care physician./Instructed patient/caregiver regarding signs and symptoms of infection./Verbal/written post procedure instructions were given to patient/caregiver./Keep the cast/splint/dressing clean and dry./Elevate the injured extremity as instructed. Chintan Singh (MD)  Anesthesiology; Pain Medicine  [address] [place] [address]  Phone: [phone]  Fax: [phone]  Follow Up Time: 1-3 days

## 2022-06-06 NOTE — H&P ADULT - NSTOBACCOSCREENHP_GEN_A_CS
----- Message from CHRISTA Vargas sent at 3/8/2022 10:28 AM CST -----  Can we get Michael a biopsy and coronaries scheduled sometime in July of this year?    Thanks!     No

## 2022-07-08 NOTE — ED ADULT NURSE NOTE - NS ED NURSE LEVEL OF CONSCIOUSNESS SPEECH
[facility] CARDIOLOGY  [address] [facility], [address]  [address] [facility], [address] [place] [address]  PHONE: [phone]    22    NAME:  Haile Basilio  : 1975  MRN: [id number]         SUBJECTIVE:   Haile Basilio is a 52 y.o. male seen for a visit regarding the following:     Chief Complaint   Patient presents with    Establish Cardiologist       HPI:      No prior cardiac hx. Hx of HTN (been on losartan but doesn't use it daily; ~4-5 years). Can walk over a couple miles with no significant issues. Some dyspnea on exertion mostly when it is hot outside. Denies any chest discomfort. No PND/orthopnea. Does not check blood pressures at home. States has seen a cardiologist about 10 years ago and appears underwent a stress test at the time. No records available. Past Medical History, Past Surgical History, Family history, Social History, and Medications were all reviewed with the patient today and updated as necessary. No Known Allergies  There is no problem list on file for this patient. Past Medical History:   Diagnosis Date    Hypertension      No past surgical history on file. Family History   Problem Relation Age of Onset    Heart Surgery Neg Hx     Heart Attack Neg Hx      Social History     Tobacco Use    Smoking status: Never Smoker    Smokeless tobacco: Never Used   Substance Use Topics    Alcohol use: Never     Current Outpatient Medications   Medication Sig Dispense Refill    losartan (COZAAR) 50 MG tablet Take 50 mg by mouth daily       No current facility-administered medications for this visit. Review of Systems   Constitutional: Negative for chills, decreased appetite, fever, malaise/fatigue and weight gain. HENT: Negative for nosebleeds. Eyes: Negative for blurred vision and double vision. Cardiovascular: Negative for chest pain, claudication, dyspnea on exertion, leg swelling, orthopnea, palpitations, paroxysmal nocturnal dyspnea and syncope. Respiratory: Negative for cough, hemoptysis and shortness of breath. Endocrine: Negative for cold intolerance and heat intolerance. Hematologic/Lymphatic: Negative for bleeding problem. Skin: Negative for rash. Musculoskeletal: Negative for back pain, joint pain, muscle weakness and myalgias. Gastrointestinal: Negative for bloating, abdominal pain, nausea and vomiting. Genitourinary: Negative for dysuria. Neurological: Negative for dizziness, light-headedness and weakness. Psychiatric/Behavioral: Negative for altered mental status. PHYSICAL EXAM:    BP (!) 159/96   Pulse 64   Ht 5' 9\" (1.753 m)   Wt 232 lb (105.2 kg)   BMI 34.26 kg/m²      Physical Exam  Constitutional:       Appearance: Normal appearance. HENT:      Head: Normocephalic and atraumatic. Mouth/Throat:      Mouth: Mucous membranes are moist.   Eyes:      Pupils: Pupils are equal, round, and reactive to light. Cardiovascular:      Rate and Rhythm: Normal rate and regular rhythm. Pulses: Normal pulses. Pulmonary:      Effort: Pulmonary effort is normal.      Breath sounds: Normal breath sounds. Abdominal:      General: Bowel sounds are normal. There is no distension. Palpations: Abdomen is soft. Tenderness: There is no abdominal tenderness. Musculoskeletal:         General: No swelling. Cervical back: Normal range of motion. Skin:     General: Skin is warm and dry. Neurological:      General: No focal deficit present. Mental Status: He is alert and oriented to person, place, and time. Medical problems and test results were reviewed with the patient today. No results found for any visits on 07/08/22.     ISAC and Nu Getachew was seen today for establish cardiologist.    Diagnoses and all orders for this visit:    Primary hypertension  -     EKG 12 lead  -     Transthoracic echocardiogram (TTE) complete with contrast, bubble, strain, and 3D PRN; Future    Abnormal EKG        Overall Impression    Hypertension-not controlled. Target less than 140/90. Continue losartan 50 mg daily but discussed taking daily. Discussed monitoring home logs for follow-up. Abnormal EKG-inferior Q's noted. Denies any significant symptoms. Obtain echocardiogram.    Return in about 6 weeks (around 8/19/2022).      Iman Olsen MD  7/8/2022  8:50 AM Speaking Coherently

## 2022-12-27 NOTE — ED PROCEDURE NOTE - PROCEDURE NAME, MLM
[facility] PT Initial Evaluation      12/27/22 1000   General Information   Type of Visit Initial OP Ortho PT Evaluation   Start of Care Date 12/27/22   Referring Physician Pato Whitt PA-C   Patient/Family Goals Statement improve balance and strength   Orders Evaluate and Treat   Date of Order 12/21/22   Certification Required? No   Medical Diagnosis Failed cervical fusion; Balance problems   Body Part(s)   Body Part(s) Lumbar Spine/SI   Presentation and Etiology   Pertinent history of current problem (include personal factors and/or comorbidities that impact the POC) Pt reports that she was good for a couple of months after her last PT bout.  However, her sciatica pain returned and after another 6 injections that failed thet decided to do another surgery to address her L3-4 level issues.  She had an L3/4 spinal fusion on 11/3/22.  She is now left with L LE weakness.  The L LE would make her fall, especially when she is going up/down stairs.  At this point her back and and R sciatic is feeling better.  She has not done any exercises, PT, etc since surgery - periodic exercises before surgery.  She is having some R low back pain and R buttocks pain.  End of November she had one fall that was bad, MD wants a CT scan, but this has not happeded due to insurance. No modalities for sx relief.   Onset date of current episode/exacerbation 11/03/22   Fall Risk Screen   Fall screen completed by PT   Have you fallen 2 or more times in the past year? Yes   Have you fallen and had an injury in the past year? Yes   Is patient a fall risk? Yes   Abuse Screen (yes response referral indicated)   Feels Unsafe at Home or Work/School no   Feels Threatened by Someone no   Does Anyone Try to Keep You From Having Contact with Others or Doing Things Outside Your Home? no   Physical Signs of Abuse Present no   Patient needs abuse support services and resources No   Lumbar Spine/SI Objective Findings   Gait/Locomotion decreased  speed and stability   Balance/Proprioception (Single Leg Stance) STS= 0x/30 seconds; Rojas =46/56   Hamstring Flexibility dec   Hip Flexor Flexibility dec   Quadricep Flexibility dec   Flexion ROM mod loss (to below knee): LBP   Extension ROM severe loss, min pain; impaired balance   Right Side Bending ROM min + loss, soreness   Left Side Bending ROM min+ loss, soreness   Lumbar ROM Comment R Rot = min loss, no pain;  L Rot = min loss, no pain   Hip Flexion (L2) Strength 4-   Hip Abduction Strength 4-   Hip Adduction Strength 4-   Hip Extension Strength 4-   Knee Flexion Strength 4   Knee Extension (L3) Strength 4   Ankle Dorsiflexion (L4) Strength 4   Ankle Plantar Flexion (S1) Strength 4   SLR min +   Crossover SLR min +   Palpation increased tenderness over B lumbar and R gluteals   Observation Pt wears a back brace into the clinic today   Posture Fair   Planned Therapy Interventions   Planned Therapy Interventions balance training;gait training;joint mobilization;manual therapy;neuromuscular re-education;ROM;strengthening;stretching   Planned Modality Interventions   Planned Modality Interventions Cryotherapy;Electrical stimulation;Hot packs;TENS   Clinical Impression   Criteria for Skilled Therapeutic Interventions Met yes, treatment indicated   PT Diagnosis Post-op LBP with LE weakness and impaired balance   Influenced by the following impairments Core and LE weakness, poor balance, tenderness   Functional limitations due to impairments ADL's, stairs, walkings, etc.   Clinical Presentation Stable/Uncomplicated   Clinical Decision Making (Complexity) Low complexity   Therapy Frequency 2 times/Week   Predicted Duration of Therapy Intervention (days/wks) 2x/week for 90 days   Risk & Benefits of therapy have been explained Yes   Patient, Family & other staff in agreement with plan of care Yes   Clinical Impression Comments Assessment:  Pt presents to skilled PT following her 4th lumbar surgery which was a L3-4  fusion on 11/3/22.  Following surgery she has increased LE weakness with L>R.  She also has impaired balance with a few falls while using the stairs recently.  Her STS and Rojas Balance scores support her balance issues. She also has decreased lumbar ROM with some soreness.  She has decreased core strength and awareness.  She has minimal lumbar and R gluteal tenderness.  She will benefit from skilled PT to address her core, LE strength as well as balance to return to her prior level of function.   Ortho Goal 1   Goal Identifier 1   Goal Description Pt will demonstrate readiness for independent sx management and HEP   Target Date 02/10/23   Ortho Goal 2   Goal Identifier 3   Goal Description Pt will be able to go from sit to stand from a regular chair height and no hands  >6x/30 seconds for improved LE strength and balance   Target Date 02/10/23   Ortho Goal 3   Goal Identifier 3   Goal Description Pt will report improved ability to go up/down stairs with a recipriocal pattern and no falls   Target Date 02/10/23   Ortho Goal 4   Goal Identifier 4   Goal Description Pt will increase her Rojas Balance score to >=54/56 for improved balance and quality of life   Target Date 02/10/23   Total Evaluation Time   PT Eval, Low Complexity Minutes (20268) 20     Mary Lora, PT

## 2023-05-17 NOTE — ED ADULT NURSE NOTE - RN DISCHARGE SIGNATURE
[address] [place] [address] Eve Vargas  Phone: [phone]   Fax: [phone]    Physical Therapy Daily Treatment Note    Date:  2023     Patient Name:  Cyrus Gardiner    :  1952  MRN: [id number]  Medical Diagnosis:  Elbow strain, left, initial encounter [S40.931H]  Treatment Diagnosis: Decreased L UE Functional Weakness and limited pain-free ROM limiting ADLS/IADLS  Insurance/Certification information:  PT Insurance Information: [employer] Medicare; $35.00 copay; Auth  Physician Information:  Katheen Gitelman, PA Lu Faden, PA  Plan of care signed (Y/N): []  Yes [x]  No     Date of Patient follow up with Physician:      Progress Report: [x]  Yes Eval  []  No     Date Range for reporting period:  Beginnin2023  Ending:     Progress report due (10 Rx/or 30 days whichever is less): visit #69 or       Recertification due (POC duration/ or 90 days whichever is less): visit #12-16 or      Visit # Insurance Allowable Auth required? Date Range   Eval  2 BCBS Medicare [x]  Yes  []  No TBD       Units approved Units used Date Range   TBD TBD TBD     Latex Allergy:  [x]NO      []YES  Preferred Language for Healthcare:   [x]English       []other:    Functional Scale:       Date assessed:  QDASH: raw score = 32; dysfunction = 47.73%  5/10/2023    Pain level:  4/10   RT hand dominant  SUBJECTIVE: Pt reports doing his HEP that was given to him. Pt. Continues to have stiffness in his left shoulder. Left shoulder has been painful for at least 10 years. Pt states he has some nerve tingling in his left forearm at times since starting therapy. OBJECTIVE:   Observation:    Left shoulder  moderate restriction in ROM, ~ 90. Visible swelling over left wrist/ thumb.    Test measurements:      RESTRICTIONS/PRECAUTIONS: L GH OA, cervical and lumbarfusions, mitral valave sx ~4 weeks ago, cardiac rehab, previous CVA, previous B Thumb fracti\ures    Exercises/Interventions: 25-Jan-2017
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
## 2024-12-03 NOTE — ED PROCEDURE NOTE - NS ED PERI VASCULAR NEG
fingers/toes warm to touch [Follow-Up Visit] : a follow-up visit for [FreeTextEntry2] : For Hemolytic Anemia.

## 2025-06-19 NOTE — DIETITIAN INITIAL EVALUATION ADULT. - NUTRITION INTERVENTION
Medical Food Supplements/Nutrition Education/Feeding Assistance Medical Food Supplements/Nutrition Education Abdominal Pain, N/V/D